# Patient Record
Sex: FEMALE | Race: WHITE | HISPANIC OR LATINO
[De-identification: names, ages, dates, MRNs, and addresses within clinical notes are randomized per-mention and may not be internally consistent; named-entity substitution may affect disease eponyms.]

---

## 2018-08-07 ENCOUNTER — TRANSCRIPTION ENCOUNTER (OUTPATIENT)
Age: 63
End: 2018-08-07

## 2018-08-07 NOTE — ASU PATIENT PROFILE, ADULT - PMH
DM (diabetes mellitus)    High cholesterol    HTN (hypertension) Autoimmune hepatitis    Chronic sinusitis    DM (diabetes mellitus)    High cholesterol    HTN (hypertension)    Hypothyroidism    Metabolic syndrome

## 2018-08-08 ENCOUNTER — OUTPATIENT (OUTPATIENT)
Dept: OUTPATIENT SERVICES | Facility: HOSPITAL | Age: 63
LOS: 1 days | End: 2018-08-08
Payer: MEDICAID

## 2018-08-08 VITALS
RESPIRATION RATE: 20 BRPM | OXYGEN SATURATION: 100 % | HEART RATE: 80 BPM | DIASTOLIC BLOOD PRESSURE: 77 MMHG | SYSTOLIC BLOOD PRESSURE: 156 MMHG

## 2018-08-08 VITALS
SYSTOLIC BLOOD PRESSURE: 137 MMHG | OXYGEN SATURATION: 98 % | RESPIRATION RATE: 19 BRPM | DIASTOLIC BLOOD PRESSURE: 71 MMHG | HEART RATE: 67 BPM | WEIGHT: 145.95 LBS | TEMPERATURE: 98 F | HEIGHT: 59 IN

## 2018-08-08 DIAGNOSIS — H25.10 AGE-RELATED NUCLEAR CATARACT, UNSPECIFIED EYE: ICD-10-CM

## 2018-08-08 DIAGNOSIS — Z98.890 OTHER SPECIFIED POSTPROCEDURAL STATES: Chronic | ICD-10-CM

## 2018-08-08 DIAGNOSIS — Z98.891 HISTORY OF UTERINE SCAR FROM PREVIOUS SURGERY: Chronic | ICD-10-CM

## 2018-08-08 LAB — GLUCOSE BLDC GLUCOMTR-MCNC: 94 MG/DL — SIGNIFICANT CHANGE UP (ref 70–99)

## 2018-08-08 PROCEDURE — 82962 GLUCOSE BLOOD TEST: CPT

## 2018-08-08 PROCEDURE — 66984 XCAPSL CTRC RMVL W/O ECP: CPT | Mod: LT

## 2018-08-08 PROCEDURE — V2632: CPT

## 2018-08-08 NOTE — ASU PREOP CHECKLIST - WAS PATIENT ON BETA BLOCKER?

## 2018-09-27 PROBLEM — E03.9 HYPOTHYROIDISM, UNSPECIFIED: Chronic | Status: ACTIVE | Noted: 2018-08-08

## 2018-09-27 PROBLEM — E88.81 METABOLIC SYNDROME AND OTHER INSULIN RESISTANCE: Chronic | Status: ACTIVE | Noted: 2018-08-08

## 2018-09-27 PROBLEM — K75.4 AUTOIMMUNE HEPATITIS: Chronic | Status: ACTIVE | Noted: 2018-08-08

## 2018-09-27 PROBLEM — I10 ESSENTIAL (PRIMARY) HYPERTENSION: Chronic | Status: ACTIVE | Noted: 2018-08-08

## 2018-09-27 PROBLEM — E11.9 TYPE 2 DIABETES MELLITUS WITHOUT COMPLICATIONS: Chronic | Status: ACTIVE | Noted: 2018-08-08

## 2018-09-27 PROBLEM — J32.9 CHRONIC SINUSITIS, UNSPECIFIED: Chronic | Status: ACTIVE | Noted: 2018-08-08

## 2018-09-27 PROBLEM — E78.00 PURE HYPERCHOLESTEROLEMIA, UNSPECIFIED: Chronic | Status: ACTIVE | Noted: 2018-08-08

## 2018-10-02 ENCOUNTER — TRANSCRIPTION ENCOUNTER (OUTPATIENT)
Age: 63
End: 2018-10-02

## 2018-10-02 NOTE — ASU PATIENT PROFILE, ADULT - PMH
Autoimmune hepatitis    Chronic sinusitis    DM (diabetes mellitus)    High cholesterol    HTN (hypertension)    Hypothyroidism    Metabolic syndrome

## 2018-10-03 ENCOUNTER — OUTPATIENT (OUTPATIENT)
Dept: OUTPATIENT SERVICES | Facility: HOSPITAL | Age: 63
LOS: 1 days | End: 2018-10-03
Payer: MEDICAID

## 2018-10-03 VITALS
DIASTOLIC BLOOD PRESSURE: 71 MMHG | SYSTOLIC BLOOD PRESSURE: 156 MMHG | HEART RATE: 59 BPM | OXYGEN SATURATION: 99 % | RESPIRATION RATE: 9 BRPM

## 2018-10-03 VITALS
HEIGHT: 59 IN | WEIGHT: 146.83 LBS | SYSTOLIC BLOOD PRESSURE: 144 MMHG | TEMPERATURE: 98 F | RESPIRATION RATE: 19 BRPM | OXYGEN SATURATION: 98 % | HEART RATE: 55 BPM | DIASTOLIC BLOOD PRESSURE: 70 MMHG

## 2018-10-03 DIAGNOSIS — H25.10 AGE-RELATED NUCLEAR CATARACT, UNSPECIFIED EYE: ICD-10-CM

## 2018-10-03 DIAGNOSIS — Z98.890 OTHER SPECIFIED POSTPROCEDURAL STATES: Chronic | ICD-10-CM

## 2018-10-03 DIAGNOSIS — Z98.891 HISTORY OF UTERINE SCAR FROM PREVIOUS SURGERY: Chronic | ICD-10-CM

## 2018-10-03 DIAGNOSIS — Z98.42 CATARACT EXTRACTION STATUS, LEFT EYE: Chronic | ICD-10-CM

## 2018-10-03 LAB — GLUCOSE BLDC GLUCOMTR-MCNC: 108 MG/DL — HIGH (ref 70–99)

## 2018-10-03 PROCEDURE — V2632: CPT

## 2018-10-03 PROCEDURE — 82962 GLUCOSE BLOOD TEST: CPT

## 2018-10-03 PROCEDURE — 66984 XCAPSL CTRC RMVL W/O ECP: CPT | Mod: RT

## 2021-07-02 ENCOUNTER — EMERGENCY (EMERGENCY)
Facility: HOSPITAL | Age: 66
LOS: 0 days | Discharge: HOME | End: 2021-07-02
Attending: EMERGENCY MEDICINE
Payer: MEDICAID

## 2021-07-02 VITALS
SYSTOLIC BLOOD PRESSURE: 156 MMHG | OXYGEN SATURATION: 100 % | HEART RATE: 73 BPM | TEMPERATURE: 98 F | DIASTOLIC BLOOD PRESSURE: 70 MMHG | RESPIRATION RATE: 18 BRPM

## 2021-07-02 VITALS
DIASTOLIC BLOOD PRESSURE: 93 MMHG | OXYGEN SATURATION: 97 % | SYSTOLIC BLOOD PRESSURE: 156 MMHG | RESPIRATION RATE: 16 BRPM | HEART RATE: 72 BPM | TEMPERATURE: 98 F

## 2021-07-02 DIAGNOSIS — E03.9 HYPOTHYROIDISM, UNSPECIFIED: ICD-10-CM

## 2021-07-02 DIAGNOSIS — E11.9 TYPE 2 DIABETES MELLITUS WITHOUT COMPLICATIONS: ICD-10-CM

## 2021-07-02 DIAGNOSIS — Z98.891 HISTORY OF UTERINE SCAR FROM PREVIOUS SURGERY: ICD-10-CM

## 2021-07-02 DIAGNOSIS — M54.5 LOW BACK PAIN: ICD-10-CM

## 2021-07-02 DIAGNOSIS — Z98.42 CATARACT EXTRACTION STATUS, LEFT EYE: Chronic | ICD-10-CM

## 2021-07-02 DIAGNOSIS — Z98.890 OTHER SPECIFIED POSTPROCEDURAL STATES: Chronic | ICD-10-CM

## 2021-07-02 DIAGNOSIS — Z98.42 CATARACT EXTRACTION STATUS, LEFT EYE: ICD-10-CM

## 2021-07-02 DIAGNOSIS — R10.9 UNSPECIFIED ABDOMINAL PAIN: ICD-10-CM

## 2021-07-02 DIAGNOSIS — E78.00 PURE HYPERCHOLESTEROLEMIA, UNSPECIFIED: ICD-10-CM

## 2021-07-02 DIAGNOSIS — Z87.19 PERSONAL HISTORY OF OTHER DISEASES OF THE DIGESTIVE SYSTEM: ICD-10-CM

## 2021-07-02 DIAGNOSIS — J32.9 CHRONIC SINUSITIS, UNSPECIFIED: ICD-10-CM

## 2021-07-02 DIAGNOSIS — R53.83 OTHER FATIGUE: ICD-10-CM

## 2021-07-02 DIAGNOSIS — R06.02 SHORTNESS OF BREATH: ICD-10-CM

## 2021-07-02 DIAGNOSIS — I10 ESSENTIAL (PRIMARY) HYPERTENSION: ICD-10-CM

## 2021-07-02 DIAGNOSIS — Z98.891 HISTORY OF UTERINE SCAR FROM PREVIOUS SURGERY: Chronic | ICD-10-CM

## 2021-07-02 LAB
ALBUMIN SERPL ELPH-MCNC: 3.8 G/DL — SIGNIFICANT CHANGE UP (ref 3.3–5)
ALP SERPL-CCNC: 64 U/L — SIGNIFICANT CHANGE UP (ref 40–120)
ALT FLD-CCNC: 24 U/L — SIGNIFICANT CHANGE UP (ref 12–78)
ANION GAP SERPL CALC-SCNC: 4 MMOL/L — LOW (ref 5–17)
APPEARANCE UR: CLEAR — SIGNIFICANT CHANGE UP
AST SERPL-CCNC: 20 U/L — SIGNIFICANT CHANGE UP (ref 15–37)
BASOPHILS # BLD AUTO: 0.02 K/UL — SIGNIFICANT CHANGE UP (ref 0–0.2)
BASOPHILS NFR BLD AUTO: 0.4 % — SIGNIFICANT CHANGE UP (ref 0–2)
BILIRUB SERPL-MCNC: 0.6 MG/DL — SIGNIFICANT CHANGE UP (ref 0.2–1.2)
BILIRUB UR-MCNC: NEGATIVE — SIGNIFICANT CHANGE UP
BUN SERPL-MCNC: 12 MG/DL — SIGNIFICANT CHANGE UP (ref 7–23)
CALCIUM SERPL-MCNC: 9 MG/DL — SIGNIFICANT CHANGE UP (ref 8.5–10.1)
CHLORIDE SERPL-SCNC: 104 MMOL/L — SIGNIFICANT CHANGE UP (ref 96–108)
CO2 SERPL-SCNC: 29 MMOL/L — SIGNIFICANT CHANGE UP (ref 22–31)
COLOR SPEC: YELLOW — SIGNIFICANT CHANGE UP
CREAT SERPL-MCNC: 0.6 MG/DL — SIGNIFICANT CHANGE UP (ref 0.5–1.3)
DIFF PNL FLD: NEGATIVE — SIGNIFICANT CHANGE UP
EOSINOPHIL # BLD AUTO: 0.19 K/UL — SIGNIFICANT CHANGE UP (ref 0–0.5)
EOSINOPHIL NFR BLD AUTO: 3.7 % — SIGNIFICANT CHANGE UP (ref 0–6)
GLUCOSE SERPL-MCNC: 85 MG/DL — SIGNIFICANT CHANGE UP (ref 70–99)
GLUCOSE UR QL: NEGATIVE MG/DL — SIGNIFICANT CHANGE UP
HCT VFR BLD CALC: 39.3 % — SIGNIFICANT CHANGE UP (ref 34.5–45)
HGB BLD-MCNC: 13.1 G/DL — SIGNIFICANT CHANGE UP (ref 11.5–15.5)
IMM GRANULOCYTES NFR BLD AUTO: 0.6 % — SIGNIFICANT CHANGE UP (ref 0–1.5)
KETONES UR-MCNC: NEGATIVE — SIGNIFICANT CHANGE UP
LEUKOCYTE ESTERASE UR-ACNC: NEGATIVE — SIGNIFICANT CHANGE UP
LIDOCAIN IGE QN: 148 U/L — SIGNIFICANT CHANGE UP (ref 73–393)
LYMPHOCYTES # BLD AUTO: 1.2 K/UL — SIGNIFICANT CHANGE UP (ref 1–3.3)
LYMPHOCYTES # BLD AUTO: 23.3 % — SIGNIFICANT CHANGE UP (ref 13–44)
MCHC RBC-ENTMCNC: 30.7 PG — SIGNIFICANT CHANGE UP (ref 27–34)
MCHC RBC-ENTMCNC: 33.3 GM/DL — SIGNIFICANT CHANGE UP (ref 32–36)
MCV RBC AUTO: 92 FL — SIGNIFICANT CHANGE UP (ref 80–100)
MONOCYTES # BLD AUTO: 0.49 K/UL — SIGNIFICANT CHANGE UP (ref 0–0.9)
MONOCYTES NFR BLD AUTO: 9.5 % — SIGNIFICANT CHANGE UP (ref 2–14)
NEUTROPHILS # BLD AUTO: 3.21 K/UL — SIGNIFICANT CHANGE UP (ref 1.8–7.4)
NEUTROPHILS NFR BLD AUTO: 62.5 % — SIGNIFICANT CHANGE UP (ref 43–77)
NITRITE UR-MCNC: NEGATIVE — SIGNIFICANT CHANGE UP
PH UR: 7 — SIGNIFICANT CHANGE UP (ref 5–8)
PLATELET # BLD AUTO: 269 K/UL — SIGNIFICANT CHANGE UP (ref 150–400)
POTASSIUM SERPL-MCNC: 3.6 MMOL/L — SIGNIFICANT CHANGE UP (ref 3.5–5.3)
POTASSIUM SERPL-SCNC: 3.6 MMOL/L — SIGNIFICANT CHANGE UP (ref 3.5–5.3)
PROT SERPL-MCNC: 7.5 GM/DL — SIGNIFICANT CHANGE UP (ref 6–8.3)
PROT UR-MCNC: NEGATIVE MG/DL — SIGNIFICANT CHANGE UP
RBC # BLD: 4.27 M/UL — SIGNIFICANT CHANGE UP (ref 3.8–5.2)
RBC # FLD: 13.7 % — SIGNIFICANT CHANGE UP (ref 10.3–14.5)
SODIUM SERPL-SCNC: 137 MMOL/L — SIGNIFICANT CHANGE UP (ref 135–145)
SP GR SPEC: 1.01 — SIGNIFICANT CHANGE UP (ref 1.01–1.02)
TROPONIN I SERPL-MCNC: <0.015 NG/ML — SIGNIFICANT CHANGE UP (ref 0.01–0.04)
UROBILINOGEN FLD QL: NEGATIVE MG/DL — SIGNIFICANT CHANGE UP
WBC # BLD: 5.14 K/UL — SIGNIFICANT CHANGE UP (ref 3.8–10.5)
WBC # FLD AUTO: 5.14 K/UL — SIGNIFICANT CHANGE UP (ref 3.8–10.5)

## 2021-07-02 PROCEDURE — 93010 ELECTROCARDIOGRAM REPORT: CPT

## 2021-07-02 PROCEDURE — 81003 URINALYSIS AUTO W/O SCOPE: CPT

## 2021-07-02 PROCEDURE — 74176 CT ABD & PELVIS W/O CONTRAST: CPT

## 2021-07-02 PROCEDURE — 93005 ELECTROCARDIOGRAM TRACING: CPT

## 2021-07-02 PROCEDURE — 96374 THER/PROPH/DIAG INJ IV PUSH: CPT

## 2021-07-02 PROCEDURE — 84484 ASSAY OF TROPONIN QUANT: CPT

## 2021-07-02 PROCEDURE — 80053 COMPREHEN METABOLIC PANEL: CPT

## 2021-07-02 PROCEDURE — 99284 EMERGENCY DEPT VISIT MOD MDM: CPT | Mod: 25

## 2021-07-02 PROCEDURE — 99285 EMERGENCY DEPT VISIT HI MDM: CPT

## 2021-07-02 PROCEDURE — 71046 X-RAY EXAM CHEST 2 VIEWS: CPT | Mod: 26

## 2021-07-02 PROCEDURE — 74176 CT ABD & PELVIS W/O CONTRAST: CPT | Mod: 26,MA

## 2021-07-02 PROCEDURE — 83690 ASSAY OF LIPASE: CPT

## 2021-07-02 PROCEDURE — 85025 COMPLETE CBC W/AUTO DIFF WBC: CPT

## 2021-07-02 PROCEDURE — 71046 X-RAY EXAM CHEST 2 VIEWS: CPT

## 2021-07-02 PROCEDURE — 87086 URINE CULTURE/COLONY COUNT: CPT

## 2021-07-02 PROCEDURE — 36415 COLL VENOUS BLD VENIPUNCTURE: CPT

## 2021-07-02 RX ORDER — SODIUM CHLORIDE 9 MG/ML
1000 INJECTION INTRAMUSCULAR; INTRAVENOUS; SUBCUTANEOUS ONCE
Refills: 0 | Status: COMPLETED | OUTPATIENT
Start: 2021-07-02 | End: 2021-07-02

## 2021-07-02 RX ORDER — CYCLOBENZAPRINE HYDROCHLORIDE 10 MG/1
1 TABLET, FILM COATED ORAL
Qty: 21 | Refills: 0
Start: 2021-07-02 | End: 2021-07-08

## 2021-07-02 RX ORDER — KETOROLAC TROMETHAMINE 30 MG/ML
15 SYRINGE (ML) INJECTION ONCE
Refills: 0 | Status: DISCONTINUED | OUTPATIENT
Start: 2021-07-02 | End: 2021-07-02

## 2021-07-02 RX ADMIN — SODIUM CHLORIDE 1000 MILLILITER(S): 9 INJECTION INTRAMUSCULAR; INTRAVENOUS; SUBCUTANEOUS at 11:27

## 2021-07-02 RX ADMIN — Medication 15 MILLIGRAM(S): at 11:27

## 2021-07-02 NOTE — ED STATDOCS - OBJECTIVE STATEMENT
67 y/o F PMHx DM, HLD, HTN, autoimmune hepatitis, and hypothyroidism, presents to the ED c/o back pain. Pt reports having back pain and tail bone radiating to her abd w/ associated fatigue starting 4 days ago. Pt endorses SOB 2/2 to the pain. Denies radiation to BLE. Pt has taken meds at home for the pain. Pt follows w/ a PCP, last seen 3 months ago.    ID: [id number]

## 2021-07-02 NOTE — ED STATDOCS - PROGRESS NOTE DETAILS
66 yr. old female presents to ED with back and tail bone pain associated with fatigue onset 4 days ago. No apparent injury. Reports SOB 2/2. No incontinence of urine or stool. Seen and examined by attending in Intake. Plan: IV, labs, CT abd./pelvis. Will F/U with results and re evaluate. Brandon CALDERON Chest X-ray TERESSA Taylor NP Reviewed results of Lab work and CT abd./pelvis with patient. Return instructions given for worsening symptoms. Agreed to F/U with PMD or Dr. Romeo as needed. Brandon NP

## 2021-07-02 NOTE — ED STATDOCS - PHYSICAL EXAMINATION
mild thoracic back pain low back pain rad to LLQ jno dys will check for UTI vs kidney, would consider lumbago. no red flags danger cuase of back pain sless dhiraj of acs dispoi  penidng labs, imaigng and reasses

## 2021-07-02 NOTE — ED STATDOCS - PATIENT PORTAL LINK FT
You can access the FollowMyHealth Patient Portal offered by [facility] by registering at the following website: [email or web address]. By joining [employer]’s FollowMyHealth portal, you will also be able to view your health information using other applications (apps) compatible with our system.

## 2021-07-02 NOTE — ED STATDOCS - CARE PROVIDER_API CALL
Farrah Mackay)  Family Medicine  [address] [place] [address]  Phone: [phone]  Fax: [phone]  Follow Up Time:     Ina Romeo)  Orthopaedic Surgery  [address] [facility], [address] [place] [address]  Phone: [phone]  Fax: [phone]  Follow Up Time:

## 2021-07-02 NOTE — ED STATDOCS - NSFOLLOWUPINSTRUCTIONS_ED_ALL_ED_FT
Rehabilitación de esguince o distensión de la parte inferior de la espalda    Low Back Sprain or Strain Rehab      Pregunte al médico qué ejercicios son seguros para usted. [place] los ejercicios exactamente dawna se lo haya indicado el médico y gradúelos dawna se lo hayan indicado. Es normal sentir un estiramiento leve, tironeo, opresión o malestar al hacer estos ejercicios. Deténgase de inmediato si siente un dolor repentino o el dolor empeora. No comience a hacer estos ejercicios hasta que se lo indique el médico.      Ejercicios de elongación y amplitud de movimiento    Estos ejercicios calientan los músculos y las articulaciones, y mejoran el movimiento y la flexibilidad de la espalda. Estos ejercicios también ayudan a aliviar el dolor, el adormecimiento y el hormigueo.      Rotación lumbar      1.Acuéstese boca arriba sobre un superficie firme y flexione las rodillas.      2.Coloque los brazos extendidos a los lados de modo que cada brazo forme un ángulo de 90 grados (ángulo recto) con respecto al cuerpo.      3.Mueva lentamente (rote) ambas rodillas hacia un lado del cuerpo hasta que sienta un estiramiento en la parte inferior de la espalda (wilner lumbar). Trate de no levantar los hombros del piso.      4.Mantenga esta posición becca __________ segundos.      5.Tensione los músculos abdominales y lentamente lleve las rodillas a la posición inicial.      6.Repita mckayla ejercicio del otro lado del cuerpo.      Repita __________ veces. Realice mckayla ejercicio __________ veces al día.      Rodilla al pecho      1.Acuéstese boca arriba en nakia superficie firme con las piernas extendidas.    2.Flexione nakia rodilla. [place] la rodilla con las swati y llévela hacia el pecho hasta que sienta un estiramiento suave en la parte inferior de la espalda y las nalgas.  •Mantenga la pierna en esta posición tomando la parte frontal de la rodilla.      •Mantenga la otra pierna lo más extendida posible.        3.Mantenga esta posición becca __________ segundos.      4.Vuelva lentamente a la posición inicial.      5.Repita el ejercicio con la otra pierna.      Repita __________ veces. Realice mckayla ejercicio __________ veces al día.      Extensión sobre los codos, en decúbito prono      1.Recuéstese boca abajo sobre nakia superficie firme (posición prona).      2.Apóyese sobre los codos.    3.Con los brazos, ayúdese a levantar el pecho hasta sentir un leve estiramiento en el abdomen y la parte inferior de la espalda.  •[place] colocará algo de peso corporal sobre los codos. Si no se siente cómodo, intente colocando almohadas debajo del pecho.      •Debe dejar la cadera inmóvil sobre la superficie en la que esté apoyado. Mantenga la cadera y los músculos de la espalda relajados.        4.Mantenga esta posición becca __________ segundos.      5.Afloje lentamente la parte superior del cuerpo y vuelva a la posición inicial.      Repita __________ veces. Realice mckayla ejercicio __________ veces al día.      Ejercicios de fortalecimiento    Estos ejercicios fortalecen la espalda y le otorgan resistencia. La resistencia es la capacidad de usar los músculos becca un tiempo prolongado, incluso después de que se cansen.    Inclinación de la pelvis   Mckayla ejercicio fortalece los músculos que se encuentran en la parte profunda del abdomen.  1.Acuéstese boca arriba sobre nakia superficie firme. Doble las rodillas y mantenga los pies planos sobre el piso.    2.Tensione los músculos abdominales. Eleve la pelvis hacia el techo y aplane la parte inferior de la espalda contra el suelo.  •Para realizar mckayla ejercicio, puede colocar nakia toalla pequeña debajo de la parte inferior de la espalda y presionar la espalda contra la toalla.        3.Mantenga esta posición becca __________ segundos.      4.Relaje totalmente los músculos antes de repetir el ejercicio.      Repita __________ veces. Realice mckayla ejercicio __________ veces al día.      Elevaciones alternadas de pierna y brazo    1.Apoye las joaquin de las swati y las rodillas sobre nakia superficie firme. Si está sobre un suelo alia, puede usar un elemento acolchado, dawna nakia alfombrilla para ejercicios, para apoyar las rodillas.      2.Alinee los brazos y las piernas. Las swati deben estar gabriel debajo de los hombros, y las rodillas debajo de la cadera.    3.Eleve la pierna izquierda hacia atrás. Al mismo tiempo, eleve el brazo derecho y estírelo frente a usted.  •No eleve la pierna por encima de la cadera.      •No eleve el brazo por encima del hombro.      •Mantenga los músculos del abdomen y de la espalda contraídos.      •Mantenga la cadera mirando hacia el suelo.      •No arquee la espalda.      •Mantenga el equilibrio con cuidado y no contenga la respiración.        4.Mantenga esta posición becca __________ segundos.      5.Vuelva lentamente a la posición inicial.      6.Repita con murry pierna derecha y murry brazo john.      Repita __________ veces. Realice mckayla ejercicio __________ veces al día.      Serie de abdominales con levantamiento de pierna extendida    1.Acuéstese boca arriba sobre nakia superficie firme.      2.Flexione nakia rodilla y mantenga la otra pierna extendida.      3.Tensione los músculos abdominales y levante la pierna extendida, a unas 4 o 6 pulgadas (10 o 15 cm) del suelo.    4.Mantenga apretados los músculos abdominales y sostenga esta posición becca __________ segundos.  •No contenga la respiración.      •No arquee la espalda. Manténgala plana contra el suelo.        5.Mantenga tensos los músculos abdominales mientras baja lentamente la pierna hasta la posición inicial.      6.Repita el ejercicio con la otra pierna.      Repita __________ veces. Realice mckayla ejercicio __________ veces al día.    Bajar nakia pierna con rodillas flexionadas    1.Acuéstese boca arriba sobre nakia superficie firme.    2.Apriete los músculos abdominales y despegue los pies del piso, laurel a la vez, de modo que las rodillas y la cadera estén flexionadas en ángulos de 90 grados (ángulos rectos).  •Las rodillas deben estar por encima de la cadera y las pantorrillas deben quedar paralelas al piso.        3.Con los músculos abdominales tensos y la rodilla flexionada, baje lentamente nakia pierna de modo que los dedos del pie toquen el suelo.    4.Levante la pierna para volver a la posición inicial.  •No contenga la respiración.      •No deje que la espalda se arquee. Mantenga la espalda plana contra el suelo.        5.Repita el ejercicio con la otra pierna.      Repita __________ veces. Realice mckayla ejercicio __________ veces al día.      Postura y mecánica corporal  La buena postura y la mecánica corporal saludable pueden ayudar a aliviar el estrés en las articulaciones y los tejidos del cuerpo. La mecánica corporal se refiere a los movimientos y a las posiciones del cuerpo mientras realiza las actividades diarias. La postura es nakia parte de la mecánica corporal. [place] buena postura significa que:  •La columna está en murry posición natural de curvatura en S (neutral).      •Los hombros están ligeramente hacia atrás.      •La cliff no está inclinada hacia adelante.      Siga esas pautas para mejorar la postura y la mecánica corporal en dhiraj actividades diarias.      De pie    •Al estar de pie, mantenga la columna en la posición neutral y los pies separados al ancho de caderas, aproximadamente. Mantenga las rodillas ligeramente flexionadas. Las orejas, los hombros y las caderas deben estar alineados.      •Cuando realice nakia tarea en la que deba estar de pie en el mismo sitio becca mucho tiempo, coloque un pie en un objeto estable de 2 a 4 pulgadas (5 a 10 cm) de alto, dawna un taburete. [place] ayuda a que la columna mantenga nakia posición neutral.        Sentado    •Cuando esté sentado, mantenga la columna en posición neutral y deje los pies apoyados en el suelo. Use un apoyapiés, si es necesario, y mantenga los muslos paralelos al suelo. Evite redondear los hombros e inclinar la cliff hacia adelante.      •Cuando trabaje en un escritorio o con nakia computadora, el escritorio debe estar a nakia altura en la que las swati estén un poco más abajo que los codos. Deslice la silla debajo del escritorio, de modo de estar lo suficientemente cerca dawna para mantener nakia buena postura.      •Cuando trabaje con nakia computadora, coloque el monitor a nakia altura que le permita mirar derecho hacia adelante, sin tener que inclinar la cliff hacia adelante o hacia atrás.      Reposo    •Al descansar o estar acostado, evite las posiciones que le causen más dolor.      •Si siente dolor al hacer actividades que exigen sentarse, inclinarse, agacharse o ponerse en cuclillas, acuéstese en nakia posición en la que el cuerpo no deba doblarse mucho. Por ejemplo, evite acurrucarse de costado con los brazos y las rodillas cerca del pecho (posición fetal).    •Si siente dolor con las actividades que exigen estar de pie becca mucho tiempo o estirar los brazos, acuéstese con la columna en nakia posición neutral y flexione ligeramente las rodillas. Pruebe con las siguientes posiciones:  •Acostarse de costado con nakia almohada entre las rodillas.      •Acostarse boca arriba con nakia almohada debajo de las rodillas.          Levantar objetos    •Cuando tenga que levantar un objeto, mantenga los pies separados el ancho de los hombros, dawna mínimo, y apriete los músculos abdominales.      •Flexione las rodillas y la cadera, y mantenga la columna en posición neutral. Es importante levantarse utilizando la fuerza de las piernas, no de la espalda. No trabe las rodillas hacia afuera.      •Siempre pida ayuda a otra persona para levantar objetos pesados o incómodos.      Esta información no tiene dawna fin reemplazar el consejo del médico. Asegúrese de hacerle al médico cualquier pregunta que tenga.      Rest. Drink plenty of fluids.  Ibuprofen 600 mg. PO every 6 hrs. for pain alternate with Tylenol 650 mg. PO every 4 hrs.  Flexeril as directed for muscle tightness.  Follow up with PMD and Spine specialist as needed.

## 2021-07-02 NOTE — ED STATDOCS - CLINICAL SUMMARY MEDICAL DECISION MAKING FREE TEXT BOX
mild thoracic back pain, low back pain radiating to LLQ. no dysuria, will check for UTI vs kidney, would consider lumbago. no red flags or dangerous causes of back pain. less suspicious of acs, dispo pending labs, imaging and reassessment. mild thoracic back pain, low back pain radiating to LLQ. no dysuria, will check for UTI vs kidney stone, would consider lumbago. no red flags or dangerous causes of back pain. less suspicious for acs, dispo pending labs, imaging and reassessment.

## 2021-07-02 NOTE — ED ADULT NURSE NOTE - OBJECTIVE STATEMENT
pt arrives to ED complaining of dizziness. pt states she woke up and felt the room spinning. pt also complains of lower back pain. denies trauma. Hx HLD, DM, HTN, hypothyroidism. alert and oriented x 4.

## 2021-07-03 LAB
CULTURE RESULTS: SIGNIFICANT CHANGE UP
SPECIMEN SOURCE: SIGNIFICANT CHANGE UP

## 2021-11-15 ENCOUNTER — EMERGENCY (EMERGENCY)
Facility: HOSPITAL | Age: 66
LOS: 1 days | Discharge: HOME | End: 2021-11-15
Attending: STUDENT IN AN ORGANIZED HEALTH CARE EDUCATION/TRAINING PROGRAM
Payer: MEDICAID

## 2021-11-15 VITALS — WEIGHT: 147.93 LBS | HEIGHT: 59 IN

## 2021-11-15 VITALS
DIASTOLIC BLOOD PRESSURE: 78 MMHG | TEMPERATURE: 98 F | OXYGEN SATURATION: 98 % | RESPIRATION RATE: 16 BRPM | SYSTOLIC BLOOD PRESSURE: 131 MMHG | HEART RATE: 64 BPM

## 2021-11-15 DIAGNOSIS — Z98.891 HISTORY OF UTERINE SCAR FROM PREVIOUS SURGERY: Chronic | ICD-10-CM
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
## 2021-11-15 DIAGNOSIS — S00.511A ABRASION OF LIP, INITIAL ENCOUNTER: ICD-10-CM

## 2021-11-15 DIAGNOSIS — S40.011A CONTUSION OF RIGHT SHOULDER, INITIAL ENCOUNTER: ICD-10-CM

## 2021-11-15 DIAGNOSIS — E78.00 PURE HYPERCHOLESTEROLEMIA, UNSPECIFIED: ICD-10-CM

## 2021-11-15 DIAGNOSIS — S09.90XA UNSPECIFIED INJURY OF HEAD, INITIAL ENCOUNTER: ICD-10-CM

## 2021-11-15 DIAGNOSIS — E03.9 HYPOTHYROIDISM, UNSPECIFIED: ICD-10-CM

## 2021-11-15 DIAGNOSIS — J32.9 CHRONIC SINUSITIS, UNSPECIFIED: ICD-10-CM

## 2021-11-15 DIAGNOSIS — I10 ESSENTIAL (PRIMARY) HYPERTENSION: ICD-10-CM

## 2021-11-15 DIAGNOSIS — Z98.890 OTHER SPECIFIED POSTPROCEDURAL STATES: Chronic | ICD-10-CM

## 2021-11-15 DIAGNOSIS — R51.9 HEADACHE, UNSPECIFIED: ICD-10-CM

## 2021-11-15 DIAGNOSIS — Z98.42 CATARACT EXTRACTION STATUS, LEFT EYE: Chronic | ICD-10-CM

## 2021-11-15 DIAGNOSIS — M47.812 SPONDYLOSIS WITHOUT MYELOPATHY OR RADICULOPATHY, CERVICAL REGION: ICD-10-CM
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
## 2021-11-15 DIAGNOSIS — M25.511 PAIN IN RIGHT SHOULDER: ICD-10-CM

## 2021-11-15 DIAGNOSIS — S02.5XXA FRACTURE OF TOOTH (TRAUMATIC), INITIAL ENCOUNTER FOR CLOSED FRACTURE: ICD-10-CM

## 2021-11-15 DIAGNOSIS — Z87.59 PERSONAL HISTORY OF OTHER COMPLICATIONS OF PREGNANCY, CHILDBIRTH AND THE PUERPERIUM: ICD-10-CM

## 2021-11-15 DIAGNOSIS — T31.0 BURNS INVOLVING LESS THAN 10% OF BODY SURFACE: ICD-10-CM

## 2021-11-15 DIAGNOSIS — T22.032A BURN OF UNSPECIFIED DEGREE OF LEFT UPPER ARM, INITIAL ENCOUNTER: ICD-10-CM

## 2021-11-15 DIAGNOSIS — K75.4 AUTOIMMUNE HEPATITIS: ICD-10-CM

## 2021-11-15 DIAGNOSIS — E88.81 METABOLIC SYNDROME AND OTHER INSULIN RESISTANCE: ICD-10-CM

## 2021-11-15 PROCEDURE — 73030 X-RAY EXAM OF SHOULDER: CPT | Mod: 26,RT

## 2021-11-15 PROCEDURE — 73090 X-RAY EXAM OF FOREARM: CPT | Mod: 26,RT

## 2021-11-15 PROCEDURE — 73130 X-RAY EXAM OF HAND: CPT | Mod: 26,RT

## 2021-11-15 PROCEDURE — 72125 CT NECK SPINE W/O DYE: CPT | Mod: 26,MA

## 2021-11-15 PROCEDURE — 76376 3D RENDER W/INTRP POSTPROCES: CPT

## 2021-11-15 PROCEDURE — 99284 EMERGENCY DEPT VISIT MOD MDM: CPT | Mod: 25

## 2021-11-15 PROCEDURE — 73130 X-RAY EXAM OF HAND: CPT | Mod: RT

## 2021-11-15 PROCEDURE — 73110 X-RAY EXAM OF WRIST: CPT | Mod: 50

## 2021-11-15 PROCEDURE — 72125 CT NECK SPINE W/O DYE: CPT | Mod: MA

## 2021-11-15 PROCEDURE — 70450 CT HEAD/BRAIN W/O DYE: CPT | Mod: 26,MA

## 2021-11-15 PROCEDURE — 93010 ELECTROCARDIOGRAM REPORT: CPT

## 2021-11-15 PROCEDURE — 70486 CT MAXILLOFACIAL W/O DYE: CPT | Mod: MA

## 2021-11-15 PROCEDURE — 99285 EMERGENCY DEPT VISIT HI MDM: CPT

## 2021-11-15 PROCEDURE — 76376 3D RENDER W/INTRP POSTPROCES: CPT | Mod: 26

## 2021-11-15 PROCEDURE — 93005 ELECTROCARDIOGRAM TRACING: CPT

## 2021-11-15 PROCEDURE — 73030 X-RAY EXAM OF SHOULDER: CPT | Mod: RT

## 2021-11-15 PROCEDURE — 70450 CT HEAD/BRAIN W/O DYE: CPT | Mod: MA

## 2021-11-15 PROCEDURE — 70486 CT MAXILLOFACIAL W/O DYE: CPT | Mod: 26,MA

## 2021-11-15 PROCEDURE — 73090 X-RAY EXAM OF FOREARM: CPT | Mod: RT

## 2021-11-15 PROCEDURE — 73110 X-RAY EXAM OF WRIST: CPT | Mod: 26,50

## 2021-11-15 RX ORDER — ACETAMINOPHEN 500 MG
975 TABLET ORAL ONCE
Refills: 0 | Status: COMPLETED | OUTPATIENT
Start: 2021-11-15 | End: 2021-11-15

## 2021-11-15 RX ORDER — IBUPROFEN 200 MG
600 TABLET ORAL ONCE
Refills: 0 | Status: COMPLETED | OUTPATIENT
Start: 2021-11-15 | End: 2021-11-15

## 2021-11-15 RX ADMIN — Medication 975 MILLIGRAM(S): at 17:35

## 2021-11-15 RX ADMIN — Medication 600 MILLIGRAM(S): at 17:35

## 2021-11-15 NOTE — ED STATDOCS - NSICDXPASTSURGICALHX_GEN_ALL_CORE_FT
PAST SURGICAL HISTORY:  Personal history of spine surgery     S/P  section     S/P left cataract extraction

## 2021-11-15 NOTE — ED STATDOCS - NSFOLLOWUPCLINICS_GEN_ALL_ED_FT
[facility]  Family Medicine  [address] [place] [address]  Phone: [phone]  Fax:

## 2021-11-15 NOTE — ED STATDOCS - CLINICAL SUMMARY MEDICAL DECISION MAKING FREE TEXT BOX
elderly fall mechanical, non syncopal with head  trauma. will get CT, X ray pain meds and reassess. Wendi RANDHAWA: 65 y/o F with a PMHx of metabolic syndrome, HTN, hypothyroidism, autoimmune hepatitis, chronic sinusitis, HLD presents to the ED s/p unwitnessed mechanical trip and fall yesterday at the bottom of the stairs approximately 530 PM. Pt here c/o R shoulder pain, HA, face pain, burning to the R hand. Pt fell forward, (+) head strike, falling onto her face. Denies SOB or CP preceding the fall. No LOC. Pt also broke her dentures  exam vss non toxic well appearing ddx c/f elderly fall mechanical, non syncopal with head  trauma. will get CT, X ray pain meds and reassess.

## 2021-11-15 NOTE — ED STATDOCS - NSICDXPASTMEDICALHX_GEN_ALL_CORE_FT
PAST MEDICAL HISTORY:  Autoimmune hepatitis     Chronic sinusitis     DM (diabetes mellitus)     High cholesterol     HTN (hypertension)     Hypothyroidism     Metabolic syndrome

## 2021-11-15 NOTE — ED ADULT TRIAGE NOTE - CHIEF COMPLAINT QUOTE
c/o trip and fall yesterday, denies LOC, unknown if she takes anticoagulants, c/o dizziness after episode, hit right side of face on concrete, c/o right hand burning pain, pain to right side of face HX: DM, HTN, and takes medication for liver, has been taking tylenol with no relief in pain

## 2021-11-15 NOTE — ED STATDOCS - NSFOLLOWUPINSTRUCTIONS_ED_ALL_ED_FT
Contusión    Contusion      Nakia contusión es un hematoma profundo. Las contusiones son el resultado de un traumatismo cerrado en los tejidos y las fibras musculares que están debajo de la piel. La lesión causa nakia hemorragia debajo de la piel. La piel sobre la contusión puede ponerse de color [place], aydee o amarillo. Las lesiones menores le causarán nakia contusión indolora; sin embargo, las lesiones más graves causan contusiones en las que el dolor y la hinchazón pueden prolongarse becca algunas semanas.      Siga estas indicaciones en murry casa:    Esté atento a cualquier cambio en los síntomas. Informe a murry médico acerca de glenda síntomas. [place] estas medidas para aliviar el dolor.      Control del dolor, el entumecimiento y la hinchazón    •Use reposo, aplicación de hielo y aplicación de presión (compresión), y poner en alto (elevar) la wilner lesionada. Generalmente, esto se conoce dawna la estrategia de RHCE.  •Mantenga la wilner de la lesión en reposo. [place] glenda actividades normales según lo indicado por el médico. Pregúntele al médico qué actividades son seguras para usted.    •Si se lo indican, aplique hielo sobre la wilner de la lesión:  •Ponga el hielo en nakia bolsa plástica.      •Coloque nakia toalla entre la piel y la bolsa.      •Coloque el hielo becca 20 minutos, 2 a 3 veces al día.        •Si se lo indican, ejerza nakia compresión suave en la wilner de la lesión con nakia venda elástica. Asegúrese de que la venda no esté muy ajustada. Quítese y vuelva a colocarse la venda dawna se lo haya indicado el médico.      •Si es posible, cuando esté sentado o acostado, levante (eleve) la wilner lesionada por encima del nivel del corazón.        Indicaciones generales     •[place] los medicamentos de venta josie y los recetados solamente dawna se lo haya indicado el médico.      •Concurra a todas las visitas de control dawna se lo haya indicado el médico. [place] es importante.        Comuníquese con un médico si:    •Los síntomas no mejoran después de varios días de tratamiento.      •Glenda síntomas empeoran.      •Tiene dificultad para  la wilner lesionada.        Solicite ayuda inmediatamente si:    •Siente dolor intenso.      •Siente adormecimiento en nakia mano o un pie.      •La mano o el pie están pálidos o fríos.        Resumen    •Nakia contusión es un hematoma profundo.      •Las contusiones son el resultado de un traumatismo cerrado en los tejidos y las fibras musculares que están debajo de la piel.      •El tratamiento es hacer reposo, aplicarse hielo, compresión y elevar la wilner afectada. Es posible que le den analgésicos de venta josie.      •Comuníquese con un médico si los síntomas no mejoran o si empeoran.       •Solicite ayuda de inmediato si tiene dolor intenso, entumecimiento o si la wilner se torna pálida o fría.      Esta información no tiene dawna fin reemplazar el consejo del médico. Asegúrese de hacerle al médico cualquier pregunta que tenga.      Document Revised: 09/17/2019 Document Reviewed: 09/17/2019    Elsevier Patient Education © 2021 Elsevier Inc.    SIGUE A TU MÉDICO EN 1-2 DÍAS. REGRESE A LA ER PARA CUALQUIER SÍNTOMA PENDIENTE O NUEVAS PREOCUPACIONES.

## 2021-11-15 NOTE — ED STATDOCS - PROGRESS NOTE DETAILS
signed Charlotte Ramsay PA-C Pt seen initially in intake by Dr. Adame. Pt declines  services, prefers to use adult family at bedside. signed Charlotte Ramsay PA-C Pt seen initially in intake by Dr. Adame. Pt declines  services, prefers to use adult family at bedside.  66F s/p trip and fall yesterday at home, fell forward and landed on hands and knees and struck face. No LOC, not on anticoagulation. pt c/o pain 'everywhere' today, especially in face, right shoulder, both wrists, and right thumb CMC region. Pt lives with family and normally ambulates on her own. pt able to ambulate in ED without assistance. No significant findings on xray or CT. No snuffbox tenderness. Pt says her right forearm has a burning pain near the wrist. Pt says her upper dentures broke when she fell. Pt has minor abrasion of upper lip, otherwise no intraoral injury. Recommend f/u PMD or ortho. return precautions given. Pt feeling well, pt and family agree with DC and plan of care.

## 2021-11-15 NOTE — ED STATDOCS - PATIENT PORTAL LINK FT
You can access the FollowMyHealth Patient Portal offered by [facility] by registering at the following website: [email or web address]. By joining [employer]’s FollowMyHealth portal, you will also be able to view your health information using other applications (apps) compatible with our system.

## 2021-11-15 NOTE — ED STATDOCS - ATTENDING CONTRIBUTION TO CARE
I have personally performed a face to face medical and diagnostic evaluation of the patient. I have discussed with and reviewed the ACP's note and agree with the History, ROS, Physical Exam and MDM unless otherwise indicated. A brief summary of my personal evaluation and impression can be found below.    Wendi RANDHAWA: I performed the initial face to face bedside interview with this patient regarding history of present illness, review of symptoms and relevant past medical, social and family history.  I completed an independent physical examination.  I was the initial provider who evaluated this patient. I have signed out the follow up of any pending tests (i.e. labs, radiological studies) to the ACP.  I have communicated the patient’s plan of care and disposition with the ACP.  The history, relevant review of systems, past medical and surgical history, medical decision making, and physical examination was documented by the scribe in my presence and I attest to the accuracy of the documentation.

## 2021-11-15 NOTE — ED ADULT NURSE NOTE - NSIMPLEMENTINTERV_GEN_ALL_ED
Implemented All Universal Safety Interventions:  [place] to call system. Call bell, personal items and telephone within reach. Instruct patient to call for assistance. Room bathroom lighting operational. Non-slip footwear when patient is off stretcher. Physically safe environment: no spills, clutter or unnecessary equipment. Stretcher in lowest position, wheels locked, appropriate side rails in place.

## 2021-11-15 NOTE — ED STATDOCS - CARE PLAN
1 Principal Discharge DX:	Head injury  Secondary Diagnosis:	Bilateral arm pain  Secondary Diagnosis:	Fall  Secondary Diagnosis:	Right shoulder pain

## 2021-11-15 NOTE — ED STATDOCS - PHYSICAL EXAMINATION
VITALS: Initial triage and subsequent vitals have been reviewed by me.  GEN APPEARANCE: WDWN, alert and cooperative, non-toxic appearing and in NAD  HEAD: Atraumatic, normocephalic   EYES: PERRLa, EOMI, vision grossly intact.   EARS: Gross hearing intact.   NOSE: No nasal discharge, no external evidence of epistaxis.   NECK: Supple  CV: RRR, S1S2, no c/r/m/g. No cyanosis or pallor. Extremities warm, well perfused. Cap refill <2 seconds. No bruits.   LUNGS: CTAB. No wheezing. No rales. No rhonchi. No diminished breath sounds.   ABDOMEN: Soft, NTND. No guarding or rebound. No masses.   MSK/EXT: Spine appears normal, no spine point tenderness. No CVA ttp. Normal muscular development. Pelvis stable. No obvious joint or bony deformity, no peripheral edema. (+) B/L wrist tenderness; R forearm tenderness ; hematoma to R shoulder   NEURO: Alert, follows commands. Weight bearing normal. Speech normal. Sensation and motor normal x4 extremities.   SKIN: Normal color for race, warm, dry and intact. No evidence of rash.  PSYCH: Normal mood and affect. VITALS: Initial triage and subsequent vitals have been reviewed by me.  GEN APPEARANCE: WDWN, alert and cooperative, non-toxic appearing and in NAD  HEAD: Atraumatic, normocephalic   EYES: PERRLa, EOMI, vision grossly intact.   EARS: Gross hearing intact.   NOSE: No nasal discharge, no external evidence of epistaxis.   NECK: Supple  CV: RRR, S1S2, no c/r/m/g. No cyanosis or pallor. Extremities warm, well perfused. Cap refill <2 seconds. No bruits.   LUNGS: CTAB. No wheezing. No rales. No rhonchi. No diminished breath sounds.   ABDOMEN: Soft, NTND. No guarding or rebound. No masses.   MSK/EXT: Spine appears normal, no spine point tenderness. No CVA ttp. Normal muscular development. Pelvis stable. No obvious joint or bony deformity, no peripheral edema. (+) B/L wrist tenderness; R forearm tenderness ; hematoma to R shoulder   NEURO: Alert, follows commands. Weight bearing normal. Speech normal. Sensation and motor normal x4 extremities. CN2-12 normal, coordination normal, ambulating normally.  SKIN: Normal color for race, warm, dry and intact. No evidence of rash.  PSYCH: Normal mood and affect.

## 2021-11-15 NOTE — ED STATDOCS - OBJECTIVE STATEMENT
67 y/o F with a PMHx of metabolic syndrome, HTN, hypothyroidism, autoimmune hepatitis, chronic sinusitis, HLD presents to the ED s/p unwitnessed mechanical trip and fall yesterday at the bottom of the stairs approximately 530 PM. Pt here c/o R shoulder pain, HA, face pain, burning to the R hand. Pt fell forward, (+) head strike, falling onto her face. Denies SOB or CP preceding the fall. No LOC. Pt also broke her dentures. Pt states that immediately after the fall, she felt very dizzy. Pt walks at baseline with no assistance.  No N/V. No abd pain. NKDA. 65 y/o F with a PMHx of metabolic syndrome, HTN, hypothyroidism, autoimmune hepatitis, chronic sinusitis, HLD presents to the ED s/p unwitnessed mechanical trip and fall yesterday at the bottom of the stairs approximately 530 PM. Pt here c/o R shoulder pain, HA, face pain, burning to the R hand. Pt fell forward, (+) head strike, falling onto her face. Denies SOB or CP preceding the fall. No LOC. Pt also broke her dentures. Pt states that immediately after the fall, she felt very dizzy. Pt walks at baseline with no assistance.  No N/V. No abd pain. NKDA.  [place] interp offered declines, prefers family at bedside.

## 2021-12-23 NOTE — ED STATDOCS - CARE PROVIDER_API CALL
Exposed to covid on Saturday. Since Tuesday, c/o cough, sinus congestion but no fever.
Margaret Vines)  [facility]  [address] [place] [address]  Phone: [phone]  Fax: [phone]  Follow Up Time:

## 2022-04-05 NOTE — ED ADULT NURSE NOTE - CAS DISCH CONDITION
Physical Therapy  Facility/Department: [facility] MED SURG  Daily Treatment Note  NAME: Kendra Sarmiento  : 1944  MRN: [id number]    Date of Service: 2022    Discharge Recommendations:  Continue to assess pending progress        Assessment   Assessment: Patient states she is tired as she just finished a bath with OT, but is agreeable to work with PT to improve mobility. Modified independence for bed mobility and SBA sit to stand. CGA for transfers and to ambulate 48' with RW. Patient to follow up with ortho tomorrow and is hopeful she will be able to increase WB on L LE. Cont PT per POC to outlined goals. Prognosis: Good  PT Education: Energy Conservation; Functional Mobility Training;General Safety; Adaptive Device Training;Gait Training  Activity Tolerance  Activity Tolerance: Patient limited by fatigue;Patient limited by endurance     Patient Diagnosis(es): There were no encounter diagnoses. has a past medical history of Colitis, GERD (gastroesophageal reflux disease), Glaucoma, and PAD (peripheral artery disease) ([facility] [address] has a past surgical history that includes Tubal ligation; eye surgery; cyst removal; skin biopsy; and Total hip arthroplasty (Left, 2019).     Restrictions  Restrictions/Precautions  Restrictions/Precautions: Weight Bearing,Fall Risk (NWB L LE)  Required Braces or Orthoses?: Yes  Lower Extremity Weight Bearing Restrictions  Left Lower Extremity Weight Bearing: Non Weight Bearing  Required Braces or Orthoses  Left Lower Extremity Brace:  (ELS brace locked in extension)  Subjective   General  Chart Reviewed: Yes  Family / Caregiver Present: No          Orientation     Cognition      Objective   Bed mobility  Supine to Sit: Modified independent  Sit to Supine: Modified independent  Scooting: Modified independent  Transfers  Sit to Stand: Stand by assistance  Stand to sit: Stand by assistance  Bed to Chair: Contact guard assistance  Ambulation  Ambulation?: Yes  WB Status: NWB L LE  Ambulation 1  Surface: level tile  Device: Rolling Walker  Assistance: Contact guard assistance  Distance: 50'x1     Balance  Sitting - Static: Good  Sitting - Dynamic: Good  Standing - Static: Good  Standing - Dynamic: Good;-     Goals  Long term goals  Time Frame for Long term goals : 10 days  Long term goal 1: Patient will perform all bed mobility with independence. Long term goal 2: Patient will perform sit to stand and transfers, NWB L LE, with RW and SBA. Long term goal 3: Patient will ambulate 50'x2, NWB L LE, with RW and SBA. Long term goal 4: Patient will demonstrate independence with HEP. Patient Goals   Patient goals : Return home.     Plan    Plan  Times per week: 4-5x/week  Times per day: Daily  Current Treatment Recommendations: Kae Hutchins Training,Patient/Caregiver Education & Training,ROM,Balance Training,Gait Training,Home Exercise Program,Functional Mobility Training,Safety Education & Training  Safety Devices  Type of devices: Call light within reach,Left in bed,Nurse notified     Therapy Time   Individual Concurrent Group Co-treatment   Time In 1326         Time Out 1350         Minutes [address] [place] [address] Stable

## 2022-12-28 ENCOUNTER — EMERGENCY (EMERGENCY)
Facility: HOSPITAL | Age: 67
LOS: 0 days | Discharge: HOME | End: 2022-12-28
Attending: STUDENT IN AN ORGANIZED HEALTH CARE EDUCATION/TRAINING PROGRAM
Payer: MEDICAID

## 2022-12-28 VITALS
RESPIRATION RATE: 18 BRPM | HEART RATE: 97 BPM | TEMPERATURE: 98 F | OXYGEN SATURATION: 98 % | SYSTOLIC BLOOD PRESSURE: 124 MMHG | DIASTOLIC BLOOD PRESSURE: 65 MMHG

## 2022-12-28 DIAGNOSIS — Z20.822 CONTACT WITH AND (SUSPECTED) EXPOSURE TO COVID-19: ICD-10-CM

## 2022-12-28 DIAGNOSIS — Z98.890 OTHER SPECIFIED POSTPROCEDURAL STATES: Chronic | ICD-10-CM

## 2022-12-28 DIAGNOSIS — Z98.42 CATARACT EXTRACTION STATUS, LEFT EYE: Chronic | ICD-10-CM

## 2022-12-28 DIAGNOSIS — E78.00 PURE HYPERCHOLESTEROLEMIA, UNSPECIFIED: ICD-10-CM

## 2022-12-28 DIAGNOSIS — R05.9 COUGH, UNSPECIFIED: ICD-10-CM

## 2022-12-28 DIAGNOSIS — E88.81 METABOLIC SYNDROME AND OTHER INSULIN RESISTANCE: ICD-10-CM

## 2022-12-28 DIAGNOSIS — B34.9 VIRAL INFECTION, UNSPECIFIED: ICD-10-CM

## 2022-12-28 DIAGNOSIS — E03.9 HYPOTHYROIDISM, UNSPECIFIED: ICD-10-CM

## 2022-12-28 DIAGNOSIS — I10 ESSENTIAL (PRIMARY) HYPERTENSION: ICD-10-CM

## 2022-12-28 DIAGNOSIS — R51.9 HEADACHE, UNSPECIFIED: ICD-10-CM

## 2022-12-28 DIAGNOSIS — R50.9 FEVER, UNSPECIFIED: ICD-10-CM

## 2022-12-28 DIAGNOSIS — K75.4 AUTOIMMUNE HEPATITIS: ICD-10-CM

## 2022-12-28 DIAGNOSIS — Z98.891 HISTORY OF UTERINE SCAR FROM PREVIOUS SURGERY: Chronic | ICD-10-CM

## 2022-12-28 DIAGNOSIS — E11.9 TYPE 2 DIABETES MELLITUS WITHOUT COMPLICATIONS: ICD-10-CM

## 2022-12-28 DIAGNOSIS — Z87.59 PERSONAL HISTORY OF OTHER COMPLICATIONS OF PREGNANCY, CHILDBIRTH AND THE PUERPERIUM: ICD-10-CM

## 2022-12-28 LAB
FLUAV AG NPH QL: DETECTED
FLUBV AG NPH QL: SIGNIFICANT CHANGE UP
RSV RNA NPH QL NAA+NON-PROBE: SIGNIFICANT CHANGE UP
SARS-COV-2 RNA SPEC QL NAA+PROBE: SIGNIFICANT CHANGE UP

## 2022-12-28 PROCEDURE — 71045 X-RAY EXAM CHEST 1 VIEW: CPT | Mod: 26

## 2022-12-28 PROCEDURE — 0241U: CPT

## 2022-12-28 PROCEDURE — 71045 X-RAY EXAM CHEST 1 VIEW: CPT

## 2022-12-28 PROCEDURE — 99284 EMERGENCY DEPT VISIT MOD MDM: CPT

## 2022-12-28 PROCEDURE — 99283 EMERGENCY DEPT VISIT LOW MDM: CPT | Mod: 25

## 2022-12-28 NOTE — ED ADULT TRIAGE NOTE - CHIEF COMPLAINT QUOTE
Patient comes to ED with c/o headache and cough that started 4 days ago. She states he has a fever but does not know how high. Denies sick contacts, chestpain, SOB.

## 2022-12-28 NOTE — ED STATDOCS - NSFOLLOWUPINSTRUCTIONS_ED_ALL_ED_FT
Infección de las vías respiratorias superiores en adultos    Upper Respiratory Infection, Adult      Nakia infección de las vías respiratorias superiores (IVRS) es nakia infección viral común de la nariz, garganta y de las vías respiratorias superiores que conducen el aire a los pulmones. El tipo más común de IVRS es el resfrío común. Las IVRS generalmente mejoran solas, sin tratamiento médico.      ¿Cuáles son las causas?    La causa es un virus. Se puede contagiar mckayla virus:  •Al aspirar las gotitas que nakia persona infectada elimina al toser o estornudar.      •Tocar algo que estuvo expuesto al virus (está contaminado) y luego tocarse la boca, la nariz o los ojos.        ¿Qué incrementa el riesgo?    Es más propenso a contraer nakia IVRS si:  •Es muy pequeño o de edad muy avanzada.      •Tiene contacto cercano con otros, dawna en el trabajo, la escuela o un centro de atención médica.      •Fuma.      •Tiene nakia enfermedad cardíaca o pulmonar a jaycee plazo (crónica).      •Tiene debilitado el sistema encargado de combatir las enfermedades (sistema inmunitario).      •Tiene asma o alergias nasales.      •Está sufriendo mucho estrés.      •Tiene un déficit nutricional.        ¿Cuáles son los signos o síntomas?    La IVRS suele presentar alguno de los siguientes síntomas:  •Secreción nasal o nariz tapada (congestión).      •Tos.      •Estornudos.      •Dolor de garganta.      •Dolor de cliff.      •Fatiga.      •Fiebre.      •Pérdida del apetito.      •Dolor en la frente, detrás de los ojos y por encima de los pómulos (dolor sinusal).      •Sue musculares.      •Enrojecimiento o irritación de los ojos.      •Presión en los oídos o la chinmay.        ¿Cómo se diagnostica?    Esta afección se puede diagnosticar en función de los antecedentes médicos, los síntomas y un examen físico. El médico puede usar un hisopo para tali nakia muestra de mucosidad de la nariz (hisopado nasal). Esta muestra puede analizarse para determinar qué virus está provocando la enfermedad.      ¿Cómo se trata?    Las IVRS generalmente mejoran por sí solas en un período de entre 7 y 10 días. Los medicamentos no curan las IVRS, sherwin el médico puede recomendarle ciertos medicamentos para ayudar a aliviar los síntomas, dawna por ejemplo:  •Medicamentos para la tos de venta josie.      •Antitusivos. Toser es un tipo de defensa contra nakia infección que ayuda a limpiar el sistema respiratorio, de modo que tome estos medicamentos solo según se lo recomiende el médico.      •Medicamentos para bajar la fiebre.        Siga estas instrucciones en murry casa:    Actividad     •Descanse todo lo que sea necesario.      •Si tiene fiebre, quédese en murry casa, es decir, no vaya al trabajo o la escuela, hasta que no tenga fiebre o hasta que el médico le diga que la IVRS ya no se puede diseminar a otras personas (ya no contagia). El médico puede pedirle que use nakia máscara facial para evitar que disemine la infección.      Para aliviar los síntomas     •Sarina gárgaras con nakia mezcla de agua y sal 3 o 4 veces al día, o según sea necesario. Para preparar agua con sal, disuelva totalmente de ½ a 1 cucharadita (de 3 a 6 g) de sal en 1 taza (237 ml) de agua tibia.      •Use un humidificador de aire frío para agregar humedad al aire. [place] puede ayudarlo a que respire mejor.        Comida y bebida   A comparison of three sample cups showing dark yellow, yellow, and pale yellow urine.   •Deedee suficiente líquido dawna para mantener la orina de color amarillo pálido.      •Weidman sopas y caldos transparentes.        Instrucciones generales   A sign showing that a person should not smoke.   •Use los medicamentos de venta josie y los recetados solamente dawna se lo haya indicado el médico. Estos incluyen medicamentos para el resfrío, para bajar la fiebre y antitusivos.      • No consuma ningún producto que contenga nicotina o tabaco. Estos productos incluyen cigarrillos, tabaco para mascar y aparatos de vapeo, dawna los cigarrillos electrónicos. Si necesita ayuda para dejar de fumar, consulte al médico.      •Aléjese del humo de otros fumadores.      •Manténgase al día con todas las vacunas, incluso la vacuna anual (nakia vez al año) contra la gripe.      •Concurra a todas las visitas de seguimiento. [place] es importante.        Cómo evitar contagiar la infección a otros   Washing hands with soap and water.   Las IVRS pueden ser contagiosas. Para evitar que la infección se propague, tome las siguientes medidas:  •Lávese las swati con agua y jabón becca al menos 20 segundos. Use desinfectante para swati si no dispone de agua y jabón.      •Evite tocarse la boca, la chinmay, los ojos o la nariz.      •Tosa o estornude en un pañuelo de papel o en murry manga o codo en lugar de hacerlo en la mano o en el aire.        Comuníquese con un médico si:    •Empeora en lugar de mejorar.      •Tiene fiebre o escalofríos.      •Tiene mucosidad marrón o blanquita.      •Tiene nakia secreción amarilla o marrón de la nariz.      •Le duele la chinmay, especialmente al inclinarse hacia adelante.      •Tiene los ganglios del feliz hinchados.      •Siente dolor al tragar.      •Tiene zonas anjali en la parte de atrás de la garganta.        Solicite ayuda de inmediato si:    •La falta de aire empeora.    •Tiene síntomas intensos o persistentes de:  •Dolor de cliff.      •Dolor de oído.      •Dolor sinusal.      •Dolor de pecho.      •Tiene enfermedad pulmonar crónica junto con cualquiera de estos síntomas:  •Emitir sonidos de silbidos agudos al respirar, más a menudo al exhalar (sibilancias).      •Tos prolongada (más de 14 días).      •Tos con umesh.      •Cambio en la mucosidad habitual.        •Tiene rigidez en el feliz.    •Tiene cambios en:  •La visión.      •La audición.      •El razonamiento.      •El estado de ánimo.        Estos síntomas pueden indicar nakia emergencia. Solicite ayuda de inmediato. Llame al 911.   • No espere a william si los síntomas desaparecen.       • No conduzca por dhiraj propios medios hasta el hospital.         Resumen    •Nakia infección de las vías respiratorias superiores (IVRS) es nakia infección común de la nariz, la garganta y las vías respiratorias superiores que conducen el aire a los pulmones.      •La causa es un virus.      •Las IVRS generalmente mejoran por sí solas en un período de entre 7 y 10 días.      •Los medicamentos no curan las IVRS, sherwin el médico puede recomendarle ciertos medicamentos para ayudar a aliviar los síntomas.      Esta información no tiene dawna fin reemplazar el consejo del médico. Asegúrese de hacerle al médico cualquier pregunta que tenga.

## 2022-12-28 NOTE — ED STATDOCS - OBJECTIVE STATEMENT
68 y/o female w/ a PMHx of DM, HLD, HTN, autoimmune hepatitis, metabolic syndrome, left cataract extraction, , and hypothyroidism presents to the ED c/o fever, cough, body aches, and HA x4 days. Denies CP, SOB, or sick contacts. Pt states she has difficulty sleeping secondary to the HA. Pt flu vaccinated. No other complaints at this time.    [id number] 68 y/o female w/ a PMHx of DM, HLD, HTN, autoimmune hepatitis, metabolic syndrome, left cataract extraction, , and hypothyroidism presents to the ED c/o fever, cough, body aches, and HA x4 days. Denies CP, SOB, or sick contacts. Pt flu vaccinated. No other complaints at this time.    [id number]

## 2022-12-28 NOTE — ED STATDOCS - PROGRESS NOTE DETAILS
Pt. is a 67 year old female Hx DM, HLD, HTN, hypothyroid presents with cough, body aches, fever, HA x 4 days.  Neg. SOB or CP.  Pt. having trouble sleeping due to the headacke.  Judith Sen PA-C Pt. with probable viral syndrome.  CXR ? LLL infiltrate will treat with Zithromax.  DC with PMD follow up and return precautions.  Judith Sen PA-C Pt. is a 67 year old female Hx DM, HLD, HTN, hypothyroid presents with cough, body aches, fever, HA x 4 days.  Neg. SOB or CP.  Pt. having trouble sleeping due to the headache.  Judith Sen PA-C CXR negative for acute findings.  Will DC with supportive car at home.  Tylenol for pain and HA.  Return for worsening symptoms.  Judith Sen PA-C

## 2022-12-28 NOTE — ED STATDOCS - ATTENDING APP SHARED VISIT CONTRIBUTION OF CARE
I, Rocío Stanley DO,  performed the initial face to face bedside interview with this patient regarding history of present illness, review of symptoms and relevant past medical, social and family history.  I completed an independent physical examination.  I was the initial provider who evaluated this patient.   I personally saw the patient and performed a substantive portion of the visit including all aspects of the medical decision making.  I have signed out the follow up of any pending tests (i.e. labs, radiological studies) to the ACP.  I have communicated the patient’s plan of care and disposition with the ACP.  The history, relevant review of systems, past medical and surgical history, medical decision making, and physical examination was documented by the scribe in my presence and I attest to the accuracy of the documentation.

## 2022-12-28 NOTE — ED STATDOCS - NSFOLLOWUPCLINICS_GEN_ALL_ED_FT
[facility]  Family Medicine  [address] [place] [address]  Phone: [phone]  Fax:

## 2022-12-28 NOTE — ED STATDOCS - PATIENT PORTAL LINK FT
You can access the FollowMyHealth Patient Portal offered by [facility] by registering at the following website: [email or web address]. By joining [facility]’s FollowMyHealth portal, you will also be able to view your health information using other applications (apps) compatible with our system.

## 2023-08-24 ENCOUNTER — EMERGENCY (EMERGENCY)
Facility: HOSPITAL | Age: 68
LOS: 0 days | Discharge: HOME | End: 2023-08-25
Attending: EMERGENCY MEDICINE
Payer: MEDICAID

## 2023-08-24 VITALS
HEART RATE: 84 BPM | OXYGEN SATURATION: 97 % | TEMPERATURE: 99 F | DIASTOLIC BLOOD PRESSURE: 110 MMHG | RESPIRATION RATE: 17 BRPM | HEIGHT: 64 IN | SYSTOLIC BLOOD PRESSURE: 190 MMHG | WEIGHT: 145.06 LBS

## 2023-08-24 DIAGNOSIS — E88.81 METABOLIC SYNDROME AND OTHER INSULIN RESISTANCE: ICD-10-CM

## 2023-08-24 DIAGNOSIS — Z79.84 LONG TERM (CURRENT) USE OF ORAL HYPOGLYCEMIC DRUGS: ICD-10-CM

## 2023-08-24 DIAGNOSIS — R51.9 HEADACHE, UNSPECIFIED: ICD-10-CM

## 2023-08-24 DIAGNOSIS — E78.00 PURE HYPERCHOLESTEROLEMIA, UNSPECIFIED: ICD-10-CM

## 2023-08-24 DIAGNOSIS — Z98.42 CATARACT EXTRACTION STATUS, LEFT EYE: Chronic | ICD-10-CM
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
## 2023-08-24 DIAGNOSIS — Z87.59 PERSONAL HISTORY OF OTHER COMPLICATIONS OF PREGNANCY, CHILDBIRTH AND THE PUERPERIUM: ICD-10-CM

## 2023-08-24 DIAGNOSIS — M54.31 SCIATICA, RIGHT SIDE: ICD-10-CM

## 2023-08-24 DIAGNOSIS — R30.0 DYSURIA: ICD-10-CM

## 2023-08-24 DIAGNOSIS — S32.010A WEDGE COMPRESSION FRACTURE OF FIRST LUMBAR VERTEBRA, INITIAL ENCOUNTER FOR CLOSED FRACTURE: ICD-10-CM
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
## 2023-08-24 DIAGNOSIS — I10 ESSENTIAL (PRIMARY) HYPERTENSION: ICD-10-CM

## 2023-08-24 DIAGNOSIS — N39.0 URINARY TRACT INFECTION, SITE NOT SPECIFIED: ICD-10-CM

## 2023-08-24 DIAGNOSIS — E11.9 TYPE 2 DIABETES MELLITUS WITHOUT COMPLICATIONS: ICD-10-CM

## 2023-08-24 DIAGNOSIS — R20.2 PARESTHESIA OF SKIN: ICD-10-CM

## 2023-08-24 DIAGNOSIS — E03.9 HYPOTHYROIDISM, UNSPECIFIED: ICD-10-CM

## 2023-08-24 DIAGNOSIS — Z98.891 HISTORY OF UTERINE SCAR FROM PREVIOUS SURGERY: Chronic | ICD-10-CM

## 2023-08-24 DIAGNOSIS — Z98.890 OTHER SPECIFIED POSTPROCEDURAL STATES: Chronic | ICD-10-CM

## 2023-08-24 DIAGNOSIS — R50.9 FEVER, UNSPECIFIED: ICD-10-CM

## 2023-08-24 LAB
ALBUMIN SERPL ELPH-MCNC: 4.4 G/DL — SIGNIFICANT CHANGE UP (ref 3.3–5)
ALP SERPL-CCNC: 67 U/L — SIGNIFICANT CHANGE UP (ref 40–120)
ALT FLD-CCNC: 26 U/L — SIGNIFICANT CHANGE UP (ref 12–78)
ANION GAP SERPL CALC-SCNC: 4 MMOL/L — LOW (ref 5–17)
APPEARANCE UR: CLEAR — SIGNIFICANT CHANGE UP
APTT BLD: 27.7 SEC — SIGNIFICANT CHANGE UP (ref 24.5–35.6)
AST SERPL-CCNC: 20 U/L — SIGNIFICANT CHANGE UP (ref 15–37)
BACTERIA # UR AUTO: ABNORMAL
BASOPHILS # BLD AUTO: 0.04 K/UL — SIGNIFICANT CHANGE UP (ref 0–0.2)
BASOPHILS NFR BLD AUTO: 0.6 % — SIGNIFICANT CHANGE UP (ref 0–2)
BILIRUB SERPL-MCNC: 0.8 MG/DL — SIGNIFICANT CHANGE UP (ref 0.2–1.2)
BILIRUB UR-MCNC: NEGATIVE — SIGNIFICANT CHANGE UP
BUN SERPL-MCNC: 9 MG/DL — SIGNIFICANT CHANGE UP (ref 7–23)
CALCIUM SERPL-MCNC: 9.8 MG/DL — SIGNIFICANT CHANGE UP (ref 8.5–10.1)
CHLORIDE SERPL-SCNC: 103 MMOL/L — SIGNIFICANT CHANGE UP (ref 96–108)
CO2 SERPL-SCNC: 30 MMOL/L — SIGNIFICANT CHANGE UP (ref 22–31)
COLOR SPEC: YELLOW — SIGNIFICANT CHANGE UP
CREAT SERPL-MCNC: 0.68 MG/DL — SIGNIFICANT CHANGE UP (ref 0.5–1.3)
DIFF PNL FLD: NEGATIVE — SIGNIFICANT CHANGE UP
EGFR: 94 ML/MIN/1.73M2 — SIGNIFICANT CHANGE UP
EOSINOPHIL # BLD AUTO: 0.15 K/UL — SIGNIFICANT CHANGE UP (ref 0–0.5)
EOSINOPHIL NFR BLD AUTO: 2.3 % — SIGNIFICANT CHANGE UP (ref 0–6)
EPI CELLS # UR: SIGNIFICANT CHANGE UP
GLUCOSE SERPL-MCNC: 121 MG/DL — HIGH (ref 70–99)
GLUCOSE UR QL: NEGATIVE — SIGNIFICANT CHANGE UP
HCT VFR BLD CALC: 40.8 % — SIGNIFICANT CHANGE UP (ref 34.5–45)
HGB BLD-MCNC: 14 G/DL — SIGNIFICANT CHANGE UP (ref 11.5–15.5)
IMM GRANULOCYTES NFR BLD AUTO: 0.3 % — SIGNIFICANT CHANGE UP (ref 0–0.9)
INR BLD: 0.95 RATIO — SIGNIFICANT CHANGE UP (ref 0.85–1.18)
KETONES UR-MCNC: NEGATIVE — SIGNIFICANT CHANGE UP
LEUKOCYTE ESTERASE UR-ACNC: ABNORMAL
LYMPHOCYTES # BLD AUTO: 1.46 K/UL — SIGNIFICANT CHANGE UP (ref 1–3.3)
LYMPHOCYTES # BLD AUTO: 22.3 % — SIGNIFICANT CHANGE UP (ref 13–44)
MCHC RBC-ENTMCNC: 31 PG — SIGNIFICANT CHANGE UP (ref 27–34)
MCHC RBC-ENTMCNC: 34.3 GM/DL — SIGNIFICANT CHANGE UP (ref 32–36)
MCV RBC AUTO: 90.5 FL — SIGNIFICANT CHANGE UP (ref 80–100)
MONOCYTES # BLD AUTO: 0.54 K/UL — SIGNIFICANT CHANGE UP (ref 0–0.9)
MONOCYTES NFR BLD AUTO: 8.3 % — SIGNIFICANT CHANGE UP (ref 2–14)
NEUTROPHILS # BLD AUTO: 4.33 K/UL — SIGNIFICANT CHANGE UP (ref 1.8–7.4)
NEUTROPHILS NFR BLD AUTO: 66.2 % — SIGNIFICANT CHANGE UP (ref 43–77)
NITRITE UR-MCNC: NEGATIVE — SIGNIFICANT CHANGE UP
PH UR: 7 — SIGNIFICANT CHANGE UP (ref 5–8)
PLATELET # BLD AUTO: 315 K/UL — SIGNIFICANT CHANGE UP (ref 150–400)
POTASSIUM SERPL-MCNC: 4.1 MMOL/L — SIGNIFICANT CHANGE UP (ref 3.5–5.3)
POTASSIUM SERPL-SCNC: 4.1 MMOL/L — SIGNIFICANT CHANGE UP (ref 3.5–5.3)
PROT SERPL-MCNC: 8.6 GM/DL — HIGH (ref 6–8.3)
PROT UR-MCNC: NEGATIVE — SIGNIFICANT CHANGE UP
PROTHROM AB SERPL-ACNC: 10.7 SEC — SIGNIFICANT CHANGE UP (ref 9.5–13)
RBC # BLD: 4.51 M/UL — SIGNIFICANT CHANGE UP (ref 3.8–5.2)
RBC # FLD: 12.9 % — SIGNIFICANT CHANGE UP (ref 10.3–14.5)
RBC CASTS # UR COMP ASSIST: ABNORMAL /HPF (ref 0–4)
SODIUM SERPL-SCNC: 137 MMOL/L — SIGNIFICANT CHANGE UP (ref 135–145)
SP GR SPEC: 1 — LOW (ref 1.01–1.02)
UROBILINOGEN FLD QL: NEGATIVE — SIGNIFICANT CHANGE UP
WBC # BLD: 6.54 K/UL — SIGNIFICANT CHANGE UP (ref 3.8–10.5)
WBC # FLD AUTO: 6.54 K/UL — SIGNIFICANT CHANGE UP (ref 3.8–10.5)
WBC UR QL: ABNORMAL /HPF (ref 0–5)

## 2023-08-24 PROCEDURE — 96374 THER/PROPH/DIAG INJ IV PUSH: CPT

## 2023-08-24 PROCEDURE — 99285 EMERGENCY DEPT VISIT HI MDM: CPT

## 2023-08-24 PROCEDURE — 80053 COMPREHEN METABOLIC PANEL: CPT

## 2023-08-24 PROCEDURE — 85610 PROTHROMBIN TIME: CPT

## 2023-08-24 PROCEDURE — 36415 COLL VENOUS BLD VENIPUNCTURE: CPT

## 2023-08-24 PROCEDURE — 96375 TX/PRO/DX INJ NEW DRUG ADDON: CPT

## 2023-08-24 PROCEDURE — 87040 BLOOD CULTURE FOR BACTERIA: CPT

## 2023-08-24 PROCEDURE — 87086 URINE CULTURE/COLONY COUNT: CPT

## 2023-08-24 PROCEDURE — 85025 COMPLETE CBC W/AUTO DIFF WBC: CPT

## 2023-08-24 PROCEDURE — 99284 EMERGENCY DEPT VISIT MOD MDM: CPT | Mod: 25

## 2023-08-24 PROCEDURE — 85730 THROMBOPLASTIN TIME PARTIAL: CPT

## 2023-08-24 PROCEDURE — 81001 URINALYSIS AUTO W/SCOPE: CPT

## 2023-08-24 RX ORDER — MORPHINE SULFATE 50 MG/1
2 CAPSULE, EXTENDED RELEASE ORAL ONCE
Refills: 0 | Status: DISCONTINUED | OUTPATIENT
Start: 2023-08-24 | End: 2023-08-24

## 2023-08-24 RX ORDER — ONDANSETRON 8 MG/1
4 TABLET, FILM COATED ORAL ONCE
Refills: 0 | Status: COMPLETED | OUTPATIENT
Start: 2023-08-24 | End: 2023-08-24

## 2023-08-24 RX ORDER — CEFTRIAXONE 500 MG/1
1000 INJECTION, POWDER, FOR SOLUTION INTRAMUSCULAR; INTRAVENOUS ONCE
Refills: 0 | Status: COMPLETED | OUTPATIENT
Start: 2023-08-24 | End: 2023-08-24

## 2023-08-24 RX ORDER — DEXAMETHASONE 0.5 MG/5ML
6 ELIXIR ORAL ONCE
Refills: 0 | Status: COMPLETED | OUTPATIENT
Start: 2023-08-24 | End: 2023-08-24

## 2023-08-24 RX ORDER — SODIUM CHLORIDE 9 MG/ML
3 INJECTION INTRAMUSCULAR; INTRAVENOUS; SUBCUTANEOUS ONCE
Refills: 0 | Status: COMPLETED | OUTPATIENT
Start: 2023-08-24 | End: 2023-08-24

## 2023-08-24 RX ADMIN — ONDANSETRON 4 MILLIGRAM(S): 8 TABLET, FILM COATED ORAL at 22:55

## 2023-08-24 RX ADMIN — MORPHINE SULFATE 2 MILLIGRAM(S): 50 CAPSULE, EXTENDED RELEASE ORAL at 22:55

## 2023-08-24 RX ADMIN — SODIUM CHLORIDE 3 MILLILITER(S): 9 INJECTION INTRAMUSCULAR; INTRAVENOUS; SUBCUTANEOUS at 22:56

## 2023-08-24 RX ADMIN — Medication 6 MILLIGRAM(S): at 22:54

## 2023-08-24 NOTE — ED STATDOCS - CARE PROVIDER_API CALL
Shane Johnson [facility]  Spine Surgery  [address]  [place] [address]  Phone: [phone]  Fax: [phone]  Follow Up Time:

## 2023-08-24 NOTE — ED ADULT NURSE NOTE - NSFALLRISKINTERV_ED_ALL_ED
Assistance OOB with selected safe patient handling equipment if applicable/Assistance with ambulation/Communicate fall risk and risk factors to all staff, patient, and family/Monitor gait and stability/Provide visual cue: yellow wristband, yellow gown, etc/Reinforce activity limits and safety measures with patient and family/Call bell, personal items and telephone in reach/Instruct patient to call for assistance before getting out of bed/chair/stretcher/Non-slip footwear applied when patient is off stretcher/[place] to call system/Physically safe environment - no spills, clutter or unnecessary equipment/Purposeful Proactive Rounding/Room/bathroom lighting operational, light cord in reach

## 2023-08-24 NOTE — ED STATDOCS - CARE PLAN
1 Principal Discharge DX:	Acute UTI  Secondary Diagnosis:	Back pain with sciatica  Secondary Diagnosis:	Lumbar compression fracture

## 2023-08-24 NOTE — ED STATDOCS - NSFOLLOWUPINSTRUCTIONS_ED_ALL_ED_FT
Take medications as prescribed.  Avoid sudden movements, heavy lifting.  Follow up with your own doctor & Spine doctor as listed below.      Urinary Tract Infection in Older Adults    WHAT YOU NEED TO KNOW:    What is a urinary tract infection (UTI)? A UTI is caused by bacteria that get inside your urinary tract. Your urinary tract includes your kidneys, ureters, bladder, and urethra. A UTI is more common in your lower urinary tract, which includes your bladder and urethra.  Kidney, Ureters, Bladder    What increases my risk for a UTI?    A UTI within the last 3 months    Menopause in women    Enlarged prostate in men    Medicines that affect urination    Urinary incontinence (you cannot control your bladder)    Sexual intercourse    Medical conditions, such as diabetes or obesity    Urinary tract problems, such as a narrowing, kidney stones, or inability to empty your bladder completely  What are the signs and symptoms of a UTI?    Fever and chills    Pain or burning when you urinate    Urine that smells bad or looks cloudy, or blood in your urine    Urinating more often or waking from sleep to urinate    Sudden, strong need to urinate    Pain or pressure in your lower abdomen    Leaking urine    Confusion or agitation    Fatigue, shakiness, and weakness  How is a UTI diagnosed? Your healthcare provider will ask about your symptoms. He or she may also examine you. You may need any of the following:    A urinalysis will give information about your urinary tract and overall health.    A urine culture may show the type of germ causing the infection. You may need this test again if you continue to have signs and symptoms after a UTI is treated.  How is a UTI treated? Medicines treat the bacterial infection or decrease pain and burning when you urinate. You may also need medicines to decrease the urge to urinate often. If you have UTIs often (called recurrent UTIs), you may be given antibiotics to take regularly. You will be given directions for when and how to use antibiotics. The goal is to prevent UTIs but not cause antibiotic resistance by using antibiotics too often.    How can I manage my symptoms?    Drink liquids as directed. Liquids can help flush bacteria from your urinary tract. Ask how much liquid to drink each day and which liquids are best for you. You may need to drink more liquids than usual to help flush out the bacteria. Do not drink alcohol, caffeine, and citrus juices. These can irritate your bladder and increase your symptoms.    Apply heat on your abdomen for 20 to 30 minutes every 2 hours for as many days as directed. Heat helps decrease discomfort and pressure in your bladder.  How can I help prevent a UTI?    Urinate when you feel the urge. Do not hold your urine. Bacteria can grow if urine stays in the bladder too long. It may be helpful to urinate at least every 3 to 4 hours.    Urinate after you have sex. This will help flush away bacteria that can enter your urinary tract during sex.    Wear cotton underwear and clothes that are loose. Tight pants and nylon underwear can trap moisture and cause bacteria to grow.    Drink cranberry juice or take cranberry supplements. These may help prevent UTIs. Your healthcare provider can recommend the right juice or supplement for you.    Women should wipe front to back after urinating or having a bowel movement. This may prevent germs from getting into the urinary tract. Do not douche or use feminine deodorants. These can change the chemical balance in your vagina. You may also be given vaginal estrogen medicine. This medicine helps prevent recurrent UTIs in women who have gone through menopause or are in more-menopause.  When should I seek immediate care?    You become confused or agitated.    You fall down.    You are urinating very little or not at all.    You are vomiting.    You have a high fever with shaking chills.    You have side or back pain that gets worse.  When should I call my doctor?    You have a fever.    You are a woman and you have increased white or yellow discharge from your vagina.    You do not feel better after 2 days of taking antibiotics.    You have questions or concerns about your condition or care.  CARE AGREEMENT:    You have the right to help plan your care. Learn about your health condition and how it may be treated. Discuss treatment options with your healthcare providers to decide what care you want to receive. You always have the right to refuse treatment.        Fractura por aplastamiento vertebral  Spinal Compression Fracture    La fractura por aplastamiento vertebral es la quebradura de los huesos que ryan la columna (vértebras). En mckayla tipo de fractura, las vértebras son aplastadas (se comprimen) y quedan en forma de cuña. En murry mayoría, las fracturas por aplastamiento se producen en la sección central o inferior de la columna vertebral.    ¿Cuáles son las causas?  Esta afección puede ser causada por lo siguiente:  El debilitamiento y la pérdida de la densidad ósea (osteoporosis). Esta es la causa más frecuente.  Nakia caída.  Un accidente de automóvil o motocicleta.  Cáncer.  Un traumatismo, dawna un golpe fifi y directo en la cliff o la espalda.  ¿Qué incrementa el riesgo?  Es más probable que sufra esta afección si:  Es mayor de 60 años.  Tiene osteoporosis.  Tiene ciertos tipos de cáncer, dawna:  Mieloma múltiple.  Linfoma.  Cáncer de próstata.  Cáncer de pulmón.  Cáncer de mama.  ¿Cuáles son los signos o síntomas?  Los síntomas de esta afección incluyen:  Dolor intenso al hacer movimientos simples dawna toser o estornudar.  Dolor que empeora con el tiempo.  Dolor que empeora al ponerse de pie, caminar, sentarse o inclinarse.  Dolor repentino que es tan intenso que dificulta el movimiento.  Columna vertebral curvada o jorobada.  Disminución gradual de la estatura.  Entumecimiento, hormigueo o debilidad en la espalda y las piernas.  Dificultad para caminar.  Los síntomas dependerán de la causa de la fractura y de la rapidez de murry evolución.    ¿Cómo se diagnostica?  Esta afección se puede diagnosticar en función de los síntomas, la historia clínica y un examen físico. Maxwell el examen físico, el médico puede darle golpes suaves a lo jaycee de la columna vertebral para determinar si hay dolor a la palpación. Pueden hacerse estudios para confirmar el diagnóstico. Estos pueden incluir los siguientes:  Nakia prueba de densidad mineral ósea para determinar la presencia de osteoporosis.  Estudios de diagnóstico por imágenes, dawna nakia radiografía, nakia exploración por tomografía computarizada (TC) o nakia resonancia magnética (RM) de la columna vertebral.  ¿Cómo se trata?  El tratamiento depende de la causa y la gravedad de la afección. Algunas fracturas pueden consolidarse por sí solas con tratamiento de apoyo. El tratamiento puede incluir lo siguiente:  Analgésicos.  Reposo.  Un dispositivo ortopédico para la espalda.  Ejercicios de fisioterapia.  Medicamentos para fortalecer los huesos.  Suplementos de calcio y vitamina D.  Las fracturas que causan deformidad de la espalda, neuralgia o debilidad, o que no responden a otros tratamientos se pueden tratar con cirugía. Puede incluir:  Vertebroplastia. Se inyecta cemento óseo en las vértebras aplastadas para estabilizarlas.  Cifoplastia con balón. Las vértebras aplastadas se expanden con un balón y luego se les inyecta el cemento óseo.  Fusión vertebral. Las vértebras aplastadas se fusionan con las vértebras normales.  Siga estas instrucciones en murry casa:  Medicamentos    Use los medicamentos de venta josie y los recetados solamente dawna se lo haya indicado el médico.  Pregúntele al médico si el medicamento recetado:  Hace necesario que evite conducir o usar maquinaria.  Puede causarle estreñimiento. Es posible que tenga que tali estas medidas para prevenir o tratar el estreñimiento:  Beber suficiente líquido dawna para mantener la orina de color amarillo pálido.  Usar medicamentos recetados o de venta josie.  Consumir alimentos ricos en fibra, dawna frijoles, cereales integrales, y frutas y verduras frescas.  Limitar el consumo de alimentos ricos en grasa y azúcares procesados, dawna los alimentos fritos o dulces.  Si tiene un aparato ortopédico:    Úselo dawna se lo haya indicado el médico. Quíteselo solamente dawan se lo haya indicado el médico.  Afloje el dispositivo ortopédico si siente hormigueo en los dedos de las swati o de los pies, o si estos se le entumecen o se le enfrían y se tornan de color lakeshia.  Manténgalo limpio.  Si el dispositivo no es impermeable:  No deje que se moje.  Cúbralo con un envoltorio hermético cuando tome un baño de inmersión o nakia ducha.  Control del dolor, la rigidez y la hinchazón      Si se lo indican, aplique hielo sobre la wilner de la lesión. Para hacer esto:  Si usa un dispositivo ortopédico desmontable, quíteselo según las indicaciones del médico.  Ponga el hielo en nakia bolsa plástica.  Coloque nakia toalla entre la piel y la bolsa.  Aplique el hielo maxwell 20 minutos, 2 o 3 veces por día.  Retire el hielo si la piel se pone de color johnson brillante. Canyon Day es muy importante. Si no puede sentir dolor, calor o frío, tiene un mayor riesgo de que se dañe la wilner.  Actividad    Sarina reposo dawna se lo haya indicado el médico.  Evite estar sentado maxwell largos períodos sin moverse. Levántese y camine un poco cada 1 a 2 horas. Canyon Day es importante para mejorar el flujo sanguíneo y la respiración. Pida ayuda si se siente débil o inestable.  Retome dhiraj actividades normales según lo indicado por el médico. Pregunte qué actividades son seguras para usted.  Sarina ejercicios de fisioterapia para mejorar la movilidad y fortalecer la espalda dawna se lo haya recomendado el médico.  Sarina ejercicio regularmente o dawna se lo haya indicado el médico.  Indicaciones generales      No arvind alcohol. El alcohol puede interferir en el tratamiento.  No consuma ningún producto que contenga nicotina o tabaco, dawna cigarrillos, cigarrillos electrónicos y tabaco de mascar. Estos pueden retrasar la consolidación del hueso. Si necesita ayuda para dejar de consumir estos productos, consulte al médico.  Cumpla con todas las visitas de seguimiento. [place] Day es importante. Puede ayudar a evitar las lesiones permanentes, la discapacidad y el dolor prolongado (crónico).  Comuníquese con un médico si:  Tiene fiebre.  El medicamento no le calma el dolor.  El dolor no mejora con el tiempo.  No puede retomar las actividades normales según lo planeado o lo esperado.  Solicite ayuda de inmediato si:  El dolor es muy intenso y empeora repentinamente.  No puede  alguna parte del cuerpo (parálisis) por debajo del nivel de la lesión.  Tiene entumecimiento, hormigueo o debilidad en alguna parte del cuerpo por debajo del nivel de la lesión.  No puede controlar los intestinos o la vejiga.  Resumen  La fractura por aplastamiento vertebral es la quebradura de los huesos que ryan la columna (vértebras).  En mckayla tipo de fractura, las vértebras son aplastadas (se comprimen) y quedan en forma de cuña.  Los síntomas y el tratamiento dependerán de la causa y la gravedad de la fractura y de la rapidez de murry evolución.  Algunas fracturas pueden consolidarse por sí solas con tratamiento de apoyo. Las fracturas que causan deformidad de la espalda, neuralgia o debilidad, o que no responden a otros tratamientos se pueden tratar con cirugía.  Esta información no tiene dawna fin reemplazar el consejo del médico. Asegúrese de hacerle al médico cualquier pregunta que tenga.

## 2023-08-24 NOTE — ED STATDOCS - OBJECTIVE STATEMENT
Pt is a 68y female with a PMH of HTN, LBP w/ sciatica, DM II, hypothyroid, cirrhosis of liver, GERD presents to the ED constant throbbing moderate to severe back pain and R posterior LE numbness radiating down the LE worsened with movement onset 3 weeks, exacerbating recently. PCP MRI 7/29 shows recent moderate L1 w/ mild retropulsion causing mild canal stenosis at T12-L1, multi level DDD causing high grade canal stenosis at L5-S1 and to lesser extent at L4-L5.  Has an outpt appt for 9/12. Cannot take the pain, thus spurring an ED visit instead of waiting for outpt. Was on pain meds w/ no relief. Endorses mild dysuria, HA, and fever, no temperature taken. Denies trauma/recent injury, weakness. On BP meds and Metformin for DM.

## 2023-08-24 NOTE — ED STATDOCS - PATIENT PORTAL LINK FT
You can access the FollowMyHealth Patient Portal offered by [facility] by registering at the following website: [email or web address]. By joining [employer]’s FollowMyHealth portal, you will also be able to view your health information using other applications (apps) compatible with our system.

## 2023-08-24 NOTE — ED STATDOCS - MUSCULOSKELETAL, MLM
MAEx4, no focal swelling/TTP. Decreased sensation in the R LE. B/l paralumbar tender, sciatic notch TTP.

## 2023-08-24 NOTE — ED STATDOCS - CLINICAL SUMMARY MEDICAL DECISION MAKING FREE TEXT BOX
68y female with a PMH of HTN, LBP w/ sciatica, DM II, hypothyroid, cirrhosis of liver, GERD w/ a known lower back disease, outpt MRI L spine shows recent L1 compression fracture. Has known DDD causing spinal stenosis. BIB son c/o worsening back pain, not alleviated by PO meds. Has outpt appt in 3 weeks. Patient poorly ambulatory due to pain, has subjective fever. R LE numbness, no motor weakness.     Plan: Labs including urine w/ culture, UA, blood w/u, IV decadron, morphine, Zofran. Monitor, observe, reassess including ambulation trial. 68y female with a PMH of HTN, LBP w/ sciatica, DM II, hypothyroid, cirrhosis of liver, GERD w/ a known lower back disease, outpt MRI L spine shows recent L1 compression fracture. Has known DDD causing spinal stenosis. BIB son c/o worsening back pain, not alleviated by PO meds. Has outpt appt in 3 weeks. Patient poorly ambulatory due to pain, has subjective fever. R LE numbness, no motor weakness.     Plan: Labs including urine w/ culture, UA, blood w/u, IV decadron, morphine, Zofran. Monitor, observe, reassess including ambulation trial.    01:00, ARIAN Saleh MD:  Labs notable for U/A + UTI with normal WBC (& pt w/o F), otherwise unremarkable.  Informed by ED RN that pt passed ambulation trial.  pain decreased s/p meds, though not resolved.  This & Spine referral d/w pt's son at her bedside, expressed his understanding & agrees with Dc home, po meds as prescribed, outpt PCp & Spine f/u.

## 2023-08-24 NOTE — ED ADULT TRIAGE NOTE - CHIEF COMPLAINT QUOTE
Patient presents to ED ambulatory c/o worsening back pain over past 3 weeks. Pt has an appointment in 3 weeks but she is in too much pain that she can't wait. Denies numbness/tingling.

## 2023-08-25 VITALS
RESPIRATION RATE: 18 BRPM | OXYGEN SATURATION: 98 % | SYSTOLIC BLOOD PRESSURE: 145 MMHG | DIASTOLIC BLOOD PRESSURE: 77 MMHG | TEMPERATURE: 98 F | HEART RATE: 77 BPM

## 2023-08-25 RX ORDER — CYCLOBENZAPRINE HYDROCHLORIDE 10 MG/1
1 TABLET, FILM COATED ORAL
Qty: 16 | Refills: 0
Start: 2023-08-25 | End: 2023-08-28

## 2023-08-25 RX ORDER — TRAMADOL HYDROCHLORIDE 50 MG/1
1 TABLET ORAL
Qty: 9 | Refills: 0
Start: 2023-08-25 | End: 2023-08-27

## 2023-08-25 RX ORDER — CEFUROXIME AXETIL 250 MG
1 TABLET ORAL
Qty: 14 | Refills: 0
Start: 2023-08-25 | End: 2023-08-31

## 2023-08-25 RX ADMIN — CEFTRIAXONE 1000 MILLIGRAM(S): 500 INJECTION, POWDER, FOR SOLUTION INTRAMUSCULAR; INTRAVENOUS at 00:20

## 2023-08-26 LAB
CULTURE RESULTS: SIGNIFICANT CHANGE UP
SPECIMEN SOURCE: SIGNIFICANT CHANGE UP

## 2023-08-30 ENCOUNTER — APPOINTMENT (OUTPATIENT)
Dept: NEUROSURGERY | Facility: CLINIC | Age: 68
End: 2023-08-30
Payer: MEDICAID

## 2023-08-30 VITALS
SYSTOLIC BLOOD PRESSURE: 146 MMHG | WEIGHT: 154 LBS | HEART RATE: 83 BPM | BODY MASS INDEX: 32.32 KG/M2 | DIASTOLIC BLOOD PRESSURE: 90 MMHG | HEIGHT: 58 IN | OXYGEN SATURATION: 95 %

## 2023-08-30 DIAGNOSIS — S32.010A WEDGE COMPRESSION FRACTURE OF FIRST LUMBAR VERTEBRA, INITIAL ENCOUNTER FOR CLOSED FRACTURE: ICD-10-CM

## 2023-08-30 LAB
CULTURE RESULTS: SIGNIFICANT CHANGE UP
CULTURE RESULTS: SIGNIFICANT CHANGE UP
SPECIMEN SOURCE: SIGNIFICANT CHANGE UP
SPECIMEN SOURCE: SIGNIFICANT CHANGE UP

## 2023-08-30 PROCEDURE — 99205 OFFICE O/P NEW HI 60 MIN: CPT

## 2023-08-30 RX ORDER — NAPROXEN 500 MG/1
500 TABLET ORAL
Qty: 60 | Refills: 0 | Status: ACTIVE | COMMUNITY
Start: 2023-08-30 | End: 1900-01-01

## 2023-08-30 RX ORDER — GABAPENTIN 300 MG/1
300 CAPSULE ORAL 3 TIMES DAILY
Qty: 60 | Refills: 0 | Status: ACTIVE | COMMUNITY
Start: 2023-08-30 | End: 1900-01-01

## 2023-08-31 ENCOUNTER — APPOINTMENT (OUTPATIENT)
Dept: RADIOLOGY | Facility: CLINIC | Age: 68
End: 2023-08-31
Payer: MEDICAID

## 2023-08-31 ENCOUNTER — OUTPATIENT (OUTPATIENT)
Dept: OUTPATIENT SERVICES | Facility: HOSPITAL | Age: 68
LOS: 1 days | End: 2023-08-31
Payer: MEDICAID

## 2023-08-31 DIAGNOSIS — M43.16 SPONDYLOLISTHESIS, LUMBAR REGION: ICD-10-CM

## 2023-08-31 DIAGNOSIS — M54.16 RADICULOPATHY, LUMBAR REGION: ICD-10-CM

## 2023-08-31 DIAGNOSIS — S32.010A WEDGE COMPRESSION FRACTURE OF FIRST LUMBAR VERTEBRA, INITIAL ENCOUNTER FOR CLOSED FRACTURE: ICD-10-CM

## 2023-08-31 DIAGNOSIS — Z98.42 CATARACT EXTRACTION STATUS, LEFT EYE: Chronic | ICD-10-CM

## 2023-08-31 DIAGNOSIS — Z98.891 HISTORY OF UTERINE SCAR FROM PREVIOUS SURGERY: Chronic | ICD-10-CM

## 2023-08-31 DIAGNOSIS — Z98.890 OTHER SPECIFIED POSTPROCEDURAL STATES: Chronic | ICD-10-CM

## 2023-08-31 PROCEDURE — 77080 DXA BONE DENSITY AXIAL: CPT | Mod: 26

## 2023-08-31 PROCEDURE — 77080 DXA BONE DENSITY AXIAL: CPT

## 2023-09-05 NOTE — CONSULT LETTER
[Dear  ___] : Dear  [unfilled], [Courtesy Letter:] : I had the pleasure of seeing your patient, [unfilled], in my office today. [Sincerely,] : Sincerely, [FreeTextEntry2] : Nehal Junior MD [address],  [place] [address]  [FreeTextEntry1] : Mrs. Phillip Quintero is a pleasant 68-year-old female patient who was seen in our office today in regards to severe low back pain and bilateral leg symptoms.  The patient is primarily Latvian-speaking and translation was provided by a  service as well as by her son at today's visit.  The patient endorses a 2-month history of severe low back pain and radiating leg pain without any specific inciting event or trauma.  The patient endorses numbness and tingling in the lower extremities chronically preceding her pain but the patient never experienced pain to this degree.  The patient is clearly in significant distress at today's visit.  The patient denies any new bladder or bowel symptoms.  The patient has significant difficulty walking secondary to pain.  On examination, the patient is alert, oriented, and compliant with the exam.  The patient is able to demonstrate gross antigravity movements in the lower extremities bilaterally but a formal examination today is limited by pain.  Similarly, examination with a reflex hammer was significantly hampered by the patient's pain and movements.  The patient is accompanied with an MRI scan of the lumbar spine dated July 29, 2023.  These images demonstrate severe degenerative changes at L2-S1.  Severe foraminal stenosis is noted at L3/4 bilaterally.  Severe central stenosis is noted at L4/5.  A spondylolisthesis is also noted at this level.  Autofusion appears to recurred at the L5/S1 level though previous surgical manipulation is noted from L2-L5. Flexion/extension x-rays were performed on June 19, 2023 which demonstrates severe degenerative changes with a L5/S1 spondylolisthesis and a mild scoliosis. This report uses different nomenclature compared to the CT scan.  Taken together, the patient has a clinical history and radiographic findings most consistent with bilateral lumbar radiculopathy secondary to the patient's spondylolisthesis and stenosis at L3 to L5.  I explained to the patient and her family that surgical intervention would be a consideration should conservative therapies fail.  The patient is scheduled to see a pain management physician in the coming days to attempt more aggressive pain treatment and will consider a surgical option.  I have explained the potential risks and benefits of the procedure to the patient's and her son and explained that a transforaminal lumbar interbody fusion would be required at L3-L5 likely also requiring an open approach given the patient's previous surgery.  The patient will be returning home to consider her options and will follow-up with us as needed. [FreeTextEntry3] : Shane Johnson MD, PhD, CS, FAANS Attending Neurosurgeon  of Neurosurgery [facility] [employer] [facility] at [facility] at [address], [place] [address] Office: [phone] Fax: [phone]

## 2023-09-12 ENCOUNTER — APPOINTMENT (OUTPATIENT)
Dept: PHYSICAL MEDICINE AND REHAB | Facility: CLINIC | Age: 68
End: 2023-09-12
Payer: MEDICAID

## 2023-09-12 VITALS
SYSTOLIC BLOOD PRESSURE: 157 MMHG | OXYGEN SATURATION: 97 % | DIASTOLIC BLOOD PRESSURE: 89 MMHG | BODY MASS INDEX: 32.54 KG/M2 | HEART RATE: 70 BPM | HEIGHT: 58 IN | WEIGHT: 155 LBS

## 2023-09-12 PROCEDURE — 99204 OFFICE O/P NEW MOD 45 MIN: CPT | Mod: GC

## 2023-09-12 RX ORDER — METHOCARBAMOL 500 MG/1
500 TABLET, FILM COATED ORAL
Qty: 30 | Refills: 0 | Status: ACTIVE | COMMUNITY
Start: 2023-09-12 | End: 1900-01-01

## 2023-09-25 ENCOUNTER — INPATIENT (INPATIENT)
Facility: HOSPITAL | Age: 68
LOS: 8 days | Discharge: HOME HEALTH | DRG: 304 | End: 2023-10-04
Attending: NEUROLOGICAL SURGERY | Admitting: NEUROLOGICAL SURGERY
Payer: MEDICAID

## 2023-09-25 VITALS — WEIGHT: 149.91 LBS | HEIGHT: 64 IN

## 2023-09-25 DIAGNOSIS — M43.16 SPONDYLOLISTHESIS, LUMBAR REGION: ICD-10-CM

## 2023-09-25 DIAGNOSIS — Z98.891 HISTORY OF UTERINE SCAR FROM PREVIOUS SURGERY: Chronic | ICD-10-CM

## 2023-09-25 DIAGNOSIS — M48.07 SPINAL STENOSIS, LUMBOSACRAL REGION: ICD-10-CM

## 2023-09-25 DIAGNOSIS — E03.9 HYPOTHYROIDISM, UNSPECIFIED: ICD-10-CM

## 2023-09-25 DIAGNOSIS — Z98.42 CATARACT EXTRACTION STATUS, LEFT EYE: Chronic | ICD-10-CM

## 2023-09-25 DIAGNOSIS — M54.9 DORSALGIA, UNSPECIFIED: ICD-10-CM

## 2023-09-25 DIAGNOSIS — Z98.890 OTHER SPECIFIED POSTPROCEDURAL STATES: Chronic | ICD-10-CM

## 2023-09-25 DIAGNOSIS — M41.56 OTHER SECONDARY SCOLIOSIS, LUMBAR REGION: ICD-10-CM

## 2023-09-25 DIAGNOSIS — E78.5 HYPERLIPIDEMIA, UNSPECIFIED: ICD-10-CM

## 2023-09-25 DIAGNOSIS — E11.9 TYPE 2 DIABETES MELLITUS WITHOUT COMPLICATIONS: ICD-10-CM

## 2023-09-25 DIAGNOSIS — M51.16 INTERVERTEBRAL DISC DISORDERS WITH RADICULOPATHY, LUMBAR REGION: ICD-10-CM

## 2023-09-25 DIAGNOSIS — M48.062 SPINAL STENOSIS, LUMBAR REGION WITH NEUROGENIC CLAUDICATION: ICD-10-CM

## 2023-09-25 DIAGNOSIS — I10 ESSENTIAL (PRIMARY) HYPERTENSION: ICD-10-CM

## 2023-09-25 DIAGNOSIS — K75.4 AUTOIMMUNE HEPATITIS: ICD-10-CM

## 2023-09-25 LAB
ALBUMIN SERPL ELPH-MCNC: 3.9 G/DL — SIGNIFICANT CHANGE UP (ref 3.3–5)
ALP SERPL-CCNC: 58 U/L — SIGNIFICANT CHANGE UP (ref 40–120)
ALT FLD-CCNC: 20 U/L — SIGNIFICANT CHANGE UP (ref 12–78)
ANION GAP SERPL CALC-SCNC: 4 MMOL/L — LOW (ref 5–17)
APPEARANCE UR: CLEAR — SIGNIFICANT CHANGE UP
APTT BLD: 27.8 SEC — SIGNIFICANT CHANGE UP (ref 24.5–35.6)
AST SERPL-CCNC: 14 U/L — LOW (ref 15–37)
BASOPHILS # BLD AUTO: 0.04 K/UL — SIGNIFICANT CHANGE UP (ref 0–0.2)
BASOPHILS NFR BLD AUTO: 0.7 % — SIGNIFICANT CHANGE UP (ref 0–2)
BILIRUB SERPL-MCNC: 0.7 MG/DL — SIGNIFICANT CHANGE UP (ref 0.2–1.2)
BILIRUB UR-MCNC: NEGATIVE — SIGNIFICANT CHANGE UP
BUN SERPL-MCNC: 7 MG/DL — SIGNIFICANT CHANGE UP (ref 7–23)
CALCIUM SERPL-MCNC: 8.9 MG/DL — SIGNIFICANT CHANGE UP (ref 8.5–10.1)
CHLORIDE SERPL-SCNC: 101 MMOL/L — SIGNIFICANT CHANGE UP (ref 96–108)
CO2 SERPL-SCNC: 32 MMOL/L — HIGH (ref 22–31)
COLOR SPEC: YELLOW — SIGNIFICANT CHANGE UP
CREAT SERPL-MCNC: 0.69 MG/DL — SIGNIFICANT CHANGE UP (ref 0.5–1.3)
DIFF PNL FLD: NEGATIVE — SIGNIFICANT CHANGE UP
EGFR: 94 ML/MIN/1.73M2 — SIGNIFICANT CHANGE UP
EOSINOPHIL # BLD AUTO: 0.12 K/UL — SIGNIFICANT CHANGE UP (ref 0–0.5)
EOSINOPHIL NFR BLD AUTO: 2.2 % — SIGNIFICANT CHANGE UP (ref 0–6)
EPI CELLS # UR: SIGNIFICANT CHANGE UP
GLUCOSE SERPL-MCNC: 144 MG/DL — HIGH (ref 70–99)
GLUCOSE UR QL: NEGATIVE — SIGNIFICANT CHANGE UP
HCT VFR BLD CALC: 37.2 % — SIGNIFICANT CHANGE UP (ref 34.5–45)
HCT VFR BLD CALC: 38.7 % — SIGNIFICANT CHANGE UP (ref 34.5–45)
HGB BLD-MCNC: 12.9 G/DL — SIGNIFICANT CHANGE UP (ref 11.5–15.5)
HGB BLD-MCNC: 13.2 G/DL — SIGNIFICANT CHANGE UP (ref 11.5–15.5)
IMM GRANULOCYTES NFR BLD AUTO: 0.4 % — SIGNIFICANT CHANGE UP (ref 0–0.9)
INR BLD: 0.95 RATIO — SIGNIFICANT CHANGE UP (ref 0.85–1.18)
KETONES UR-MCNC: NEGATIVE — SIGNIFICANT CHANGE UP
LACTATE SERPL-SCNC: 1.3 MMOL/L — SIGNIFICANT CHANGE UP (ref 0.7–2)
LEUKOCYTE ESTERASE UR-ACNC: ABNORMAL
LIDOCAIN IGE QN: 60 U/L — SIGNIFICANT CHANGE UP (ref 13–75)
LYMPHOCYTES # BLD AUTO: 1.36 K/UL — SIGNIFICANT CHANGE UP (ref 1–3.3)
LYMPHOCYTES # BLD AUTO: 24.4 % — SIGNIFICANT CHANGE UP (ref 13–44)
MAGNESIUM SERPL-MCNC: 1.9 MG/DL — SIGNIFICANT CHANGE UP (ref 1.6–2.6)
MCHC RBC-ENTMCNC: 30.4 PG — SIGNIFICANT CHANGE UP (ref 27–34)
MCHC RBC-ENTMCNC: 31 PG — SIGNIFICANT CHANGE UP (ref 27–34)
MCHC RBC-ENTMCNC: 34.1 GM/DL — SIGNIFICANT CHANGE UP (ref 32–36)
MCHC RBC-ENTMCNC: 34.7 GM/DL — SIGNIFICANT CHANGE UP (ref 32–36)
MCV RBC AUTO: 89.2 FL — SIGNIFICANT CHANGE UP (ref 80–100)
MCV RBC AUTO: 89.4 FL — SIGNIFICANT CHANGE UP (ref 80–100)
MONOCYTES # BLD AUTO: 0.32 K/UL — SIGNIFICANT CHANGE UP (ref 0–0.9)
MONOCYTES NFR BLD AUTO: 5.7 % — SIGNIFICANT CHANGE UP (ref 2–14)
NEUTROPHILS # BLD AUTO: 3.71 K/UL — SIGNIFICANT CHANGE UP (ref 1.8–7.4)
NEUTROPHILS NFR BLD AUTO: 66.6 % — SIGNIFICANT CHANGE UP (ref 43–77)
NITRITE UR-MCNC: NEGATIVE — SIGNIFICANT CHANGE UP
PH UR: 7 — SIGNIFICANT CHANGE UP (ref 5–8)
PLATELET # BLD AUTO: 337 K/UL — SIGNIFICANT CHANGE UP (ref 150–400)
PLATELET # BLD AUTO: 344 K/UL — SIGNIFICANT CHANGE UP (ref 150–400)
POTASSIUM SERPL-MCNC: 3.5 MMOL/L — SIGNIFICANT CHANGE UP (ref 3.5–5.3)
POTASSIUM SERPL-SCNC: 3.5 MMOL/L — SIGNIFICANT CHANGE UP (ref 3.5–5.3)
PROT SERPL-MCNC: 7.3 GM/DL — SIGNIFICANT CHANGE UP (ref 6–8.3)
PROT UR-MCNC: 15
PROTHROM AB SERPL-ACNC: 10.8 SEC — SIGNIFICANT CHANGE UP (ref 9.5–13)
RAPID RVP RESULT: SIGNIFICANT CHANGE UP
RBC # BLD: 4.16 M/UL — SIGNIFICANT CHANGE UP (ref 3.8–5.2)
RBC # BLD: 4.34 M/UL — SIGNIFICANT CHANGE UP (ref 3.8–5.2)
RBC # FLD: 13.4 % — SIGNIFICANT CHANGE UP (ref 10.3–14.5)
RBC # FLD: 13.5 % — SIGNIFICANT CHANGE UP (ref 10.3–14.5)
RBC CASTS # UR COMP ASSIST: NEGATIVE /HPF — SIGNIFICANT CHANGE UP (ref 0–4)
SARS-COV-2 RNA SPEC QL NAA+PROBE: SIGNIFICANT CHANGE UP
SODIUM SERPL-SCNC: 137 MMOL/L — SIGNIFICANT CHANGE UP (ref 135–145)
SP GR SPEC: 1 — LOW (ref 1.01–1.02)
UROBILINOGEN FLD QL: NEGATIVE — SIGNIFICANT CHANGE UP
WBC # BLD: 5.57 K/UL — SIGNIFICANT CHANGE UP (ref 3.8–10.5)
WBC # BLD: 5.58 K/UL — SIGNIFICANT CHANGE UP (ref 3.8–10.5)
WBC # FLD AUTO: 5.57 K/UL — SIGNIFICANT CHANGE UP (ref 3.8–10.5)
WBC # FLD AUTO: 5.58 K/UL — SIGNIFICANT CHANGE UP (ref 3.8–10.5)
WBC UR QL: SIGNIFICANT CHANGE UP /HPF (ref 0–5)

## 2023-09-25 PROCEDURE — 85730 THROMBOPLASTIN TIME PARTIAL: CPT

## 2023-09-25 PROCEDURE — 81003 URINALYSIS AUTO W/O SCOPE: CPT

## 2023-09-25 PROCEDURE — 99254 IP/OBS CNSLTJ NEW/EST MOD 60: CPT

## 2023-09-25 PROCEDURE — 86901 BLOOD TYPING SEROLOGIC RH(D): CPT

## 2023-09-25 PROCEDURE — 80053 COMPREHEN METABOLIC PANEL: CPT

## 2023-09-25 PROCEDURE — 84100 ASSAY OF PHOSPHORUS: CPT

## 2023-09-25 PROCEDURE — 93306 TTE W/DOPPLER COMPLETE: CPT

## 2023-09-25 PROCEDURE — 83735 ASSAY OF MAGNESIUM: CPT

## 2023-09-25 PROCEDURE — 93005 ELECTROCARDIOGRAM TRACING: CPT

## 2023-09-25 PROCEDURE — 80048 BASIC METABOLIC PNL TOTAL CA: CPT

## 2023-09-25 PROCEDURE — 85610 PROTHROMBIN TIME: CPT

## 2023-09-25 PROCEDURE — 83036 HEMOGLOBIN GLYCOSYLATED A1C: CPT

## 2023-09-25 PROCEDURE — 97116 GAIT TRAINING THERAPY: CPT | Mod: GP

## 2023-09-25 PROCEDURE — 36415 COLL VENOUS BLD VENIPUNCTURE: CPT

## 2023-09-25 PROCEDURE — 86850 RBC ANTIBODY SCREEN: CPT

## 2023-09-25 PROCEDURE — 99285 EMERGENCY DEPT VISIT HI MDM: CPT

## 2023-09-25 PROCEDURE — 82962 GLUCOSE BLOOD TEST: CPT

## 2023-09-25 PROCEDURE — 86900 BLOOD TYPING SEROLOGIC ABO: CPT

## 2023-09-25 PROCEDURE — C1889: CPT

## 2023-09-25 PROCEDURE — 71045 X-RAY EXAM CHEST 1 VIEW: CPT | Mod: 26

## 2023-09-25 PROCEDURE — C1713: CPT

## 2023-09-25 PROCEDURE — 85027 COMPLETE CBC AUTOMATED: CPT

## 2023-09-25 PROCEDURE — 97163 PT EVAL HIGH COMPLEX 45 MIN: CPT | Mod: GP

## 2023-09-25 PROCEDURE — 76000 FLUOROSCOPY <1 HR PHYS/QHP: CPT

## 2023-09-25 PROCEDURE — 86803 HEPATITIS C AB TEST: CPT

## 2023-09-25 RX ORDER — OXYCODONE HYDROCHLORIDE 5 MG/1
5 TABLET ORAL EVERY 4 HOURS
Refills: 0 | Status: DISCONTINUED | OUTPATIENT
Start: 2023-09-25 | End: 2023-09-29

## 2023-09-25 RX ORDER — SODIUM CHLORIDE 9 MG/ML
1000 INJECTION INTRAMUSCULAR; INTRAVENOUS; SUBCUTANEOUS ONCE
Refills: 0 | Status: COMPLETED | OUTPATIENT
Start: 2023-09-25 | End: 2023-09-25

## 2023-09-25 RX ORDER — CYCLOBENZAPRINE HYDROCHLORIDE 10 MG/1
10 TABLET, FILM COATED ORAL THREE TIMES A DAY
Refills: 0 | Status: DISCONTINUED | OUTPATIENT
Start: 2023-09-25 | End: 2023-10-04

## 2023-09-25 RX ORDER — MORPHINE SULFATE 50 MG/1
2 CAPSULE, EXTENDED RELEASE ORAL ONCE
Refills: 0 | Status: DISCONTINUED | OUTPATIENT
Start: 2023-09-25 | End: 2023-09-25

## 2023-09-25 RX ORDER — ONDANSETRON 8 MG/1
4 TABLET, FILM COATED ORAL EVERY 6 HOURS
Refills: 0 | Status: DISCONTINUED | OUTPATIENT
Start: 2023-09-25 | End: 2023-10-04

## 2023-09-25 RX ORDER — ENOXAPARIN SODIUM 100 MG/ML
40 INJECTION SUBCUTANEOUS EVERY 24 HOURS
Refills: 0 | Status: DISCONTINUED | OUTPATIENT
Start: 2023-09-26 | End: 2023-09-28

## 2023-09-25 RX ORDER — MORPHINE SULFATE 50 MG/1
2 CAPSULE, EXTENDED RELEASE ORAL EVERY 4 HOURS
Refills: 0 | Status: DISCONTINUED | OUTPATIENT
Start: 2023-09-25 | End: 2023-09-25

## 2023-09-25 RX ORDER — ACETAMINOPHEN 500 MG
650 TABLET ORAL EVERY 6 HOURS
Refills: 0 | Status: DISCONTINUED | OUTPATIENT
Start: 2023-09-25 | End: 2023-10-04

## 2023-09-25 RX ADMIN — MORPHINE SULFATE 2 MILLIGRAM(S): 50 CAPSULE, EXTENDED RELEASE ORAL at 13:14

## 2023-09-25 RX ADMIN — OXYCODONE HYDROCHLORIDE 5 MILLIGRAM(S): 5 TABLET ORAL at 21:38

## 2023-09-25 RX ADMIN — SODIUM CHLORIDE 1000 MILLILITER(S): 9 INJECTION INTRAMUSCULAR; INTRAVENOUS; SUBCUTANEOUS at 13:14

## 2023-09-25 RX ADMIN — OXYCODONE HYDROCHLORIDE 5 MILLIGRAM(S): 5 TABLET ORAL at 21:56

## 2023-09-25 NOTE — ED STATDOCS - MUSCULOSKELETAL, MLM
Pain in her lower back radiating down her right leg. Negative straight leg raise. Motor 5/5 both lower extremities.

## 2023-09-25 NOTE — ED STATDOCS - PROGRESS NOTE DETAILS
69 y/o female with PMHx of spondylosis, metabolic syndrome, hypothyroidism, autoimmune hepatitis, HLD, DM Type II, HTN presents to the ED c/o worsening back pain radiating to her legs x1 month, with new onset body aches and headache x2 days. Patient with lower back surgery scheduled for 10/23 with Dr. Shane Johnson, however presents for management of unbearable pain. spoke with neurosurgery team, will see pt. ~Will Obrien PA-C Patient with persistent back pain. Neurosurgery team will admit patient. ~Will Obrien PA-C

## 2023-09-25 NOTE — CONSULT NOTE ADULT - ASSESSMENT
67 yo F hx of spinal stenosis and foraminal stenosis with severe radiculopathy scheduled for surgery 10/23 with Dr. Johnson but patient states pain is intolerable.  67 yo F hx of spinal stenosis and foraminal stenosis with severe radiculopathy scheduled for surgery 10/23 with Dr. Johnson but patient states pain is intolerable. Plan to admit to Dr. Johnson Neurousurgery and book for surgery in the coming days. She will need medical clearance prior to surgery.     - Admit to Neurosurgery  - Pain control  - Medicine consult for clearance     D/w Dr. Johnson

## 2023-09-25 NOTE — ED STATDOCS - OBJECTIVE STATEMENT
69 y/o female with PMHx of spondylosis, metabolic syndrome, hypothyroidism, autoimmune hepatitis, HLD, DM Type II, HTN presents to the ED c/o worsening back pain radiating to her legs x1 month, with new onset body aches and headache x2 days. Patient with lower back surgery scheduled for 10/23 with Dr. Shane Johnson, however presents for management of unbearable pain.

## 2023-09-25 NOTE — H&P ADULT - NSHPLABSRESULTS_GEN_ALL_CORE
ICU Vital Signs Last 24 Hrs  T(C): 36.4 (25 Sep 2023 13:21), Max: 36.6 (25 Sep 2023 12:28)  T(F): 97.5 (25 Sep 2023 13:21), Max: 97.9 (25 Sep 2023 12:28)  HR: 73 (25 Sep 2023 13:21) (69 - 73)  BP: 165/80 (25 Sep 2023 13:21) (165/80 - 180/73)  BP(mean): 103 (25 Sep 2023 12:28) (103 - 103)  ABP: --  ABP(mean): --  RR: 18 (25 Sep 2023 13:21) (18 - 18)  SpO2: 99% (25 Sep 2023 13:21) (98% - 99%)    O2 Parameters below as of 25 Sep 2023 13:21  Patient On (Oxygen Delivery Method): room air

## 2023-09-25 NOTE — PATIENT PROFILE ADULT - HISTORY OF COVID-19 VACCINATION
Quality 130: Documentation Of Current Medications In The Medical Record: Current Medications Documented Detail Level: Detailed Quality 110: Preventive Care And Screening: Influenza Immunization: Influenza Immunization not Administered because Patient Refused. Vaccine status unknown

## 2023-09-25 NOTE — ED STATDOCS - ATTENDING APP SHARED VISIT CONTRIBUTION OF CARE
I personally saw the patient with the GLORIA, and completed the key components of the history and physical exam. I then discussed the management plan with the LGORIA.

## 2023-09-25 NOTE — PATIENT PROFILE ADULT - FALL HARM RISK - HARM RISK INTERVENTIONS

## 2023-09-25 NOTE — H&P ADULT - ASSESSMENT
69 yo F hx of spinal stenosis and foraminal stenosis with severe radiculopathy scheduled for surgery 10/23 with Dr. Johnson but patient states pain is intolerable. Plan to admit to Dr. Johnson Neurousurgery and book for surgery in the coming days. She will need medical clearance prior to surgery.     - Admit to Neurosurgery  - Pain control  - Medicine consult for clearance   - Labs: CBC, BMP, Coags, UA     D/w Dr. Johnson LVM with patient: need to check INR

## 2023-09-25 NOTE — ED ADULT NURSE NOTE - NSFALLUNIVINTERV_ED_ALL_ED
Bed/Stretcher in lowest position, wheels locked, appropriate side rails in place/Call bell, personal items and telephone in reach/Instruct patient to call for assistance before getting out of bed/chair/stretcher/Non-slip footwear applied when patient is off stretcher/[place] to call system/Physically safe environment - no spills, clutter or unnecessary equipment/Purposeful proactive rounding/Room/bathroom lighting operational, light cord in reach

## 2023-09-25 NOTE — H&P ADULT - NSHPPHYSICALEXAM_GEN_ALL_CORE
PE:   Awake, interactive, Upper sorbian speaking   RLE Q 5 IP 5 PF 5 DF 5   LLE Q 5 IP 5 PF 5 DF 5   Positive straight leg raise bilaterally   + JPS  No clonus   Hyperreflexia in b/l LE

## 2023-09-25 NOTE — H&P ADULT - HISTORY OF PRESENT ILLNESS
69 yo F hx of back pain, hypothyroidism, autoimmune hepatitis, HLD, DM Type II, HTN, degenerative disc disease and spinal stenosis is presenting with intolerable bilateral lower extremity pain and back pain x months. She was recently seen in the ED in September for similar symptoms but was discharged home with pain meds and instructed to follow up at her schedule appointment outpatient with Dr. Johnson. Her MRI at the time revealed multilevel degenerative disc disease, high grade stenosis at L5-S1 and marked foraminal stenosis at L4-L5. She is scheduled for surgery on 10/23 with Dr. Johnson but she states she cannot wait that long because the pain has become unbearable. She has been taking Gabapentin, Flexeril and Naproxen at home without relief. The pain is not better or worse with change in position. She states it is constant and wakes her up at night. She has no urinary retention or fecal incontinence.

## 2023-09-25 NOTE — ED STATDOCS - PHYSICAL EXAMINATION
PA NOTE: GEN: AOX3, NAD. HEENT: Throat clear. Airway is patent. EYES: PERRLA. EOMI. Head: NC/AT. NECK: Supple, No JVD. FROM. C-spine non-tender. CV:S1S2, RRR, LUNGS: Non-labored breathing, no tachypnea. O2sat 100% RA. CTA b/l. No w/r/r. CHEST: Equal chest expansion and rise. No deformity. ABD: Soft, NT/ND, no rebound, no guarding. No CVAT. EXT: No e/c/c. 2+ distal pulses. SKIN: No rashes. NEURO: No focal deficits. CN II-XII intact. FROM. 5/5 motor and sensory. ~Will Obrien PA-C

## 2023-09-25 NOTE — ED ADULT TRIAGE NOTE - CHIEF COMPLAINT QUOTE
PT C/O LOWER BACK PAIN. S/P FRACTURE 25 YEARS AGO. DENIES INJURY. DUE FOR BACK SURGERY 10/2023. PMH: HEPATITIS, HBP, DMII

## 2023-09-25 NOTE — ED STATDOCS - CLINICAL SUMMARY MEDICAL DECISION MAKING FREE TEXT BOX
67 y/o female with PMHx of HTN, DM presents with worsening back pain, scheduled for surgery next month. Will check labs including viral panel and discuss with neurosurgery PA possible next step. 69 y/o female with PMHx of HTN, DM presents with worsening back pain, scheduled for surgery next month. Will check labs including viral panel and discuss with neurosurgery PA possible next step.    Patient with persistent back pain. Neurosurgery team will admit patient. ~Will Obrien PA-C

## 2023-09-25 NOTE — CONSULT NOTE ADULT - SUBJECTIVE AND OBJECTIVE BOX
69 yo F hx of back pain, hypothyroidism, autoimmune hepatitis, HLD, DM Type II, HTN, degenerative disc disease and spinal stenosis is presenting with intolerable bilateral lower extremity pain and back pain x months. She was recently seen in the ED in September for similar symptoms but was discharged home with pain meds and instructed to follow up at her schedule appointment outpatient with Dr. Johnson. Her MRI at the time revealed multilevel degenerative disc disease, high grade stenosis at L5-S1 and marked foraminal stenosis at L4-L5. She is scheduled for surgery on 10/23 with Dr. Johnson but she states she cannot wait that long because the pain has become unbearable. She has been taking Gabapentin, Flexeril and Naproxen at home without relief. The pain is not better or worse with change in position. She states it is constant and wakes her up at night. She has no urinary retention or fecal incontinence.     ICU Vital Signs Last 24 Hrs  T(C): 36.4 (25 Sep 2023 13:21), Max: 36.6 (25 Sep 2023 12:28)  T(F): 97.5 (25 Sep 2023 13:21), Max: 97.9 (25 Sep 2023 12:28)  HR: 73 (25 Sep 2023 13:21) (69 - 73)  BP: 165/80 (25 Sep 2023 13:21) (165/80 - 180/73)  BP(mean): 103 (25 Sep 2023 12:28) (103 - 103)  ABP: --  ABP(mean): --  RR: 18 (25 Sep 2023 13:21) (18 - 18)  SpO2: 99% (25 Sep 2023 13:21) (98% - 99%)    O2 Parameters below as of 25 Sep 2023 13:21  Patient On (Oxygen Delivery Method): room air    PE:   Awake, interactive, Dominican speaking   RLE Q 5 IP 5 PF 5 DF 5   LLE Q 5 IP 5 PF 5 DF 5   Positive straight leg raise bilaterally   + JPS  No clonus   Hyperreflexia in b/l LE

## 2023-09-26 LAB
ALBUMIN SERPL ELPH-MCNC: 4.2 G/DL — SIGNIFICANT CHANGE UP (ref 3.3–5)
ALP SERPL-CCNC: 59 U/L — SIGNIFICANT CHANGE UP (ref 40–120)
ALT FLD-CCNC: 21 U/L — SIGNIFICANT CHANGE UP (ref 12–78)
ANION GAP SERPL CALC-SCNC: 4 MMOL/L — LOW (ref 5–17)
AST SERPL-CCNC: 14 U/L — LOW (ref 15–37)
BILIRUB SERPL-MCNC: 0.6 MG/DL — SIGNIFICANT CHANGE UP (ref 0.2–1.2)
BUN SERPL-MCNC: 10 MG/DL — SIGNIFICANT CHANGE UP (ref 7–23)
CALCIUM SERPL-MCNC: 9.4 MG/DL — SIGNIFICANT CHANGE UP (ref 8.5–10.1)
CHLORIDE SERPL-SCNC: 103 MMOL/L — SIGNIFICANT CHANGE UP (ref 96–108)
CO2 SERPL-SCNC: 29 MMOL/L — SIGNIFICANT CHANGE UP (ref 22–31)
CREAT SERPL-MCNC: 0.72 MG/DL — SIGNIFICANT CHANGE UP (ref 0.5–1.3)
EGFR: 90 ML/MIN/1.73M2 — SIGNIFICANT CHANGE UP
GLUCOSE BLDC GLUCOMTR-MCNC: 110 MG/DL — HIGH (ref 70–99)
GLUCOSE BLDC GLUCOMTR-MCNC: 111 MG/DL — HIGH (ref 70–99)
GLUCOSE BLDC GLUCOMTR-MCNC: 93 MG/DL — SIGNIFICANT CHANGE UP (ref 70–99)
GLUCOSE SERPL-MCNC: 135 MG/DL — HIGH (ref 70–99)
PHOSPHATE SERPL-MCNC: 3.2 MG/DL — SIGNIFICANT CHANGE UP (ref 2.5–4.5)
POTASSIUM SERPL-MCNC: 3.6 MMOL/L — SIGNIFICANT CHANGE UP (ref 3.5–5.3)
POTASSIUM SERPL-SCNC: 3.6 MMOL/L — SIGNIFICANT CHANGE UP (ref 3.5–5.3)
PROT SERPL-MCNC: 7.9 GM/DL — SIGNIFICANT CHANGE UP (ref 6–8.3)
SODIUM SERPL-SCNC: 136 MMOL/L — SIGNIFICANT CHANGE UP (ref 135–145)

## 2023-09-26 PROCEDURE — 99231 SBSQ HOSP IP/OBS SF/LOW 25: CPT

## 2023-09-26 PROCEDURE — 99253 IP/OBS CNSLTJ NEW/EST LOW 45: CPT

## 2023-09-26 RX ORDER — DEXTROSE 50 % IN WATER 50 %
15 SYRINGE (ML) INTRAVENOUS ONCE
Refills: 0 | Status: DISCONTINUED | OUTPATIENT
Start: 2023-09-26 | End: 2023-09-27

## 2023-09-26 RX ORDER — DEXTROSE 50 % IN WATER 50 %
25 SYRINGE (ML) INTRAVENOUS ONCE
Refills: 0 | Status: DISCONTINUED | OUTPATIENT
Start: 2023-09-26 | End: 2023-09-27

## 2023-09-26 RX ORDER — METOPROLOL TARTRATE 50 MG
1 TABLET ORAL
Refills: 0 | DISCHARGE

## 2023-09-26 RX ORDER — INSULIN LISPRO 100/ML
VIAL (ML) SUBCUTANEOUS
Refills: 0 | Status: DISCONTINUED | OUTPATIENT
Start: 2023-09-26 | End: 2023-09-27

## 2023-09-26 RX ORDER — METFORMIN HYDROCHLORIDE 850 MG/1
1 TABLET ORAL
Refills: 0 | DISCHARGE

## 2023-09-26 RX ORDER — GLUCAGON INJECTION, SOLUTION 0.5 MG/.1ML
1 INJECTION, SOLUTION SUBCUTANEOUS ONCE
Refills: 0 | Status: DISCONTINUED | OUTPATIENT
Start: 2023-09-26 | End: 2023-09-27

## 2023-09-26 RX ORDER — AZATHIOPRINE 100 MG/1
1 TABLET ORAL
Refills: 0 | DISCHARGE

## 2023-09-26 RX ORDER — SODIUM CHLORIDE 9 MG/ML
1000 INJECTION, SOLUTION INTRAVENOUS
Refills: 0 | Status: DISCONTINUED | OUTPATIENT
Start: 2023-09-26 | End: 2023-09-27

## 2023-09-26 RX ORDER — ALENDRONATE SODIUM 70 MG/1
1 TABLET ORAL
Refills: 0 | DISCHARGE

## 2023-09-26 RX ORDER — URSODIOL 250 MG/1
1 TABLET, FILM COATED ORAL
Refills: 0 | DISCHARGE

## 2023-09-26 RX ORDER — LEVOTHYROXINE SODIUM 125 MCG
1 TABLET ORAL
Refills: 0 | DISCHARGE

## 2023-09-26 RX ORDER — DEXTROSE 50 % IN WATER 50 %
12.5 SYRINGE (ML) INTRAVENOUS ONCE
Refills: 0 | Status: DISCONTINUED | OUTPATIENT
Start: 2023-09-26 | End: 2023-09-27

## 2023-09-26 RX ORDER — LOSARTAN POTASSIUM 100 MG/1
1 TABLET, FILM COATED ORAL
Refills: 0 | DISCHARGE

## 2023-09-26 RX ORDER — GABAPENTIN 400 MG/1
2 CAPSULE ORAL
Refills: 0 | DISCHARGE

## 2023-09-26 RX ORDER — BUDESONIDE, MICRONIZED 100 %
1 POWDER (GRAM) MISCELLANEOUS
Refills: 0 | DISCHARGE

## 2023-09-26 RX ADMIN — OXYCODONE HYDROCHLORIDE 5 MILLIGRAM(S): 5 TABLET ORAL at 03:04

## 2023-09-26 RX ADMIN — ENOXAPARIN SODIUM 40 MILLIGRAM(S): 100 INJECTION SUBCUTANEOUS at 11:07

## 2023-09-26 RX ADMIN — OXYCODONE HYDROCHLORIDE 5 MILLIGRAM(S): 5 TABLET ORAL at 22:02

## 2023-09-26 RX ADMIN — OXYCODONE HYDROCHLORIDE 5 MILLIGRAM(S): 5 TABLET ORAL at 03:34

## 2023-09-26 RX ADMIN — OXYCODONE HYDROCHLORIDE 5 MILLIGRAM(S): 5 TABLET ORAL at 11:55

## 2023-09-26 RX ADMIN — OXYCODONE HYDROCHLORIDE 5 MILLIGRAM(S): 5 TABLET ORAL at 22:23

## 2023-09-26 RX ADMIN — OXYCODONE HYDROCHLORIDE 5 MILLIGRAM(S): 5 TABLET ORAL at 11:07

## 2023-09-26 NOTE — PHARMACOTHERAPY INTERVENTION NOTE - INTERVENTION CATEGORIES
Vaginitis: Care Instructions  Your Care Instructions    Vaginitis is soreness or infection of the vagina. This common problem can cause itching and burning. And it can cause a change in vaginal discharge. Sometimes it can cause pain during sex. Vaginitis may be caused by bacteria, yeast, or other germs. Some infections that cause it are caught from a sexual partner. Bath products, spermicides, and douches can irritate the vagina too. Some women have this problem during and after menopause. A drop in estrogen levels during this time can cause dryness, soreness, and pain during sex. Your doctor can give you medicine to treat an infection. And home care may help you feel better. For certain types of infections, your sex partner must be treated too. Follow-up care is a key part of your treatment and safety. Be sure to make and go to all appointments, and call your doctor if you are having problems. It's also a good idea to know your test results and keep a list of the medicines you take. How can you care for yourself at home? · If your doctor prescribed antibiotics, take them as directed. Do not stop taking them just because you feel better. You need to take the full course of antibiotics. · Take your medicines exactly as prescribed. Call your doctor if you think you are having a problem with your medicine. · Do not eat or drink anything that has alcohol if you are taking metronidazole (Flagyl). · If you have a yeast infection, use over-the-counter products as your doctor tells you to. Or take medicine your doctor prescribes exactly as directed. · Wash your vaginal area daily with water. You also can use a mild, unscented soap if you want. · Do not use scented bath products. And do not use vaginal sprays or douches. · Put a washcloth soaked in cool water on the area to relieve itching. Or you can take cool baths.   · If you have dryness because of menopause, use estrogen cream or pills that your doctor prescribes. · Ask your doctor about when it is okay to have sex. · Use a personal lubricant before sex if you have dryness. Examples are Astroglide, K-Y Jelly, and Wet Lubricant Gel. · Ask your doctor if your sex partner also needs treatment. When should you call for help? Call your doctor now or seek immediate medical care if:  · You have a fever and pelvic pain. Watch closely for changes in your health, and be sure to contact your doctor if:  · You have bleeding other than your period. · You do not get better as expected. Where can you learn more? Go to http://gloria-penelope.info/. Enter V400 in the search box to learn more about \"Vaginitis: Care Instructions. \"  Current as of: February 25, 2016  Content Version: 11.1  © 8771-2992 [employer]. Care instructions adapted under license by [employer] (which disclaims liability or warranty for this information). If you have questions about a medical condition or this instruction, always ask your healthcare professional. [address] any warranty or liability for your use of this information. Med Reconciliation

## 2023-09-26 NOTE — PROGRESS NOTE ADULT - ASSESSMENT
69 yo F hx of spinal stenosis and foraminal stenosis with severe radiculopathy scheduled for surgery 10/23 with Dr. Johnson but patient states pain is intolerable. Plan to admit to Dr. Johnson Neurousurgery and book for surgery in the coming days. She will need medical clearance prior to surgery.     #Severe lumbar stenosis with radicular pain   #Hyothyroid  #HTN  #DM type II  #Autoimmune Hepatitis     PLAN-  Surgery is schedule for Friday 9/29  Hospitalist consult for medical clearance   Continue pain control as needed   SQ heparin for DVT PPX till surgery   Incentive spirometer   Consistent carbohydrate diet and sliding scale insulin as needed   Continue home Blood pressure medications   Continue Synthroid     Discussed with Dr. Johnson who agrees with plan

## 2023-09-26 NOTE — CONSULT NOTE ADULT - SUBJECTIVE AND OBJECTIVE BOX
C/c:  back pain with radiation to b/l LE    HPI: 68F, pmh of HTN, HLD, DMII, AI hepatitis, Hypothyroidism, Spinal stenosis who           9/26- Pt seen and examined on 2N, no events overnight, states she didn't sleep well, still c/o back pain and still c/o pain in her anterior thighs, surgery is scheduled for this coming Friday.     Vital Signs Last 24 Hrs  T(C): 36.6 (26 Sep 2023 08:04), Max: 37 (25 Sep 2023 16:55)  T(F): 97.9 (26 Sep 2023 08:04), Max: 98.6 (25 Sep 2023 16:55)  HR: 76 (26 Sep 2023 08:04) (56 - 76)  BP: 138/76 (26 Sep 2023 08:04) (133/65 - 180/73)  BP(mean): 82 (25 Sep 2023 16:55) (82 - 103)  RR: 16 (26 Sep 2023 08:04) (16 - 18)  SpO2: 99% (26 Sep 2023 08:04) (97% - 99%)    Parameters below as of 26 Sep 2023 08:04  Patient On (Oxygen Delivery Method): room air    MEDICATIONS  (STANDING):  enoxaparin Injectable 40 milliGRAM(s) SubCutaneous every 24 hours    MEDICATIONS  (PRN):  acetaminophen     Tablet .. 650 milliGRAM(s) Oral every 6 hours PRN Mild Pain (1 - 3)  cyclobenzaprine 10 milliGRAM(s) Oral three times a day PRN Muscle Spasm  morphine  - Injectable 2 milliGRAM(s) IV Push every 4 hours PRN Severe Pain (7 - 10)  ondansetron Injectable 4 milliGRAM(s) IV Push every 6 hours PRN Nausea and/or Vomiting  oxyCODONE    IR 5 milliGRAM(s) Oral every 4 hours PRN Moderate Pain (4 - 6)    ROS: pertinent positives in HPI, all other ROS were reviewed and are negative     PHYSICAL EXAM:  Constitutional: awake and alert  HEENT: PERRLA, EOMI, hearing intact   Neck: Supple.  Respiratory: Breath sounds are clear bilaterally  Cardiovascular: S1 and S2, regular rhythm  Gastrointestinal: soft, nontender, obese   Extremities:  no edema  Vascular: Carotid Bruit - no  Musculoskeletal: + straight leg raise b/l   Skin: No rashes  Psych: normal affect, participates in exam     Neurological exam:  HF: A x O x 3. Appropriately interactive, normal affect. Speech fluent in Kazakh, no Aphasia or paraphasic errors. Naming /repetition intact   CN: MARGARET, EOMI, VFF, facial sensation normal, no NLFD, tongue midline, Palate moves equally, SCM equal bilaterally  Motor: No pronator drift, Strength 5/5 in all 4 ext, normal bulk and tone, no tremor, rigidity or bradykinesia.    Sens: Intact to light touch  Reflexes: Symmetric and normal, downgoing toes b/l, no clonus   Coord:  No FNFA, dysmetria, KRISTI intact   Gait/Balance: Not tested       LABS:                         12.9   5.58  )-----------( 337      ( 25 Sep 2023 17:49 )             37.2     09-26    136  |  103  |  10  ----------------------------<  135<H>  3.6   |  29  |  0.72    Ca    9.4      26 Sep 2023 07:17  Phos  3.2     09-26  Mg     1.9     09-25    TPro  7.9  /  Alb  4.2  /  TBili  0.6  /  DBili  x   /  AST  14<L>  /  ALT  21  /  AlkPhos  59  09-26    LIVER FUNCTIONS - ( 26 Sep 2023 07:17 )  Alb: 4.2 g/dL / Pro: 7.9 gm/dL / ALK PHOS: 59 U/L / ALT: 21 U/L / AST: 14 U/L / GGT: x           RADIOLOGY:  outside imaging reviewed         Assessment and Plan:   · Assessment	  69 yo F hx of spinal stenosis and foraminal stenosis with severe radiculopathy scheduled for surgery 10/23 with Dr. Johnson but patient states pain is intolerable. Plan to admit to Dr. Johnson Neurousurgery and book for surgery in the coming days. She will need medical clearance prior to surgery.     #Severe lumbar stenosis with radicular pain   #Hyothyroid  #HTN  #DM type II  #Autoimmune Hepatitis     PLAN-  Surgery is schedule for Friday 9/29  Hospitalist consult for medical clearance   Continue pain control as needed   SQ heparin for DVT PPX till surgery   Incentive spirometer   Consistent carbohydrate diet and sliding scale insulin as needed   Continue home Blood pressure medications   Continue Synthroid     Discussed with Dr. Johnson who agrees with plan    C/c:  back pain with radiation to b/l LE    HPI: 68F, pmh of HTN, HLD, DMII, AI hepatitis, Hypothyroidism, Spinal stenosis who presented to the ER with intractable back pain/bilateral LE pain. She has knon spinal stenosis @L5-S1 with foraminal stenosis at L4-5. She was scheduled for surgery 10/23 with Dr. Harden, but bc of intractable pain came to the ER. She is admitted to neurosx. Hospitalist service consulted for medical clearance/comanagement.   pt seen and examined earlier today on 2N. [place] ok. c/o pain at iv site. No sob/chest pain. No recent stress test that she can recall. she has had spine surgery in [facility] in the past. No complications with anesthesia. Does not bleed easily.   No h/o MI/stroke.   Denies h/o heart disease.   Can climb a flight of stairs and walk a few blocks without sob/chest pains. No recent f/c/r/n/v/d.     ROS: all 10 systems reviewed and is as above otherwise negative.     PMH as above  PSH: spine surgery X 2 , , cataract  F/H: denies significant medical conditions in immediate family members  Social: no tobacco/etoh  Allergies: NKDA  Home meds: see med rec    Vital Signs Last 24 Hrs  T(C): 36.9 (26 Sep 2023 16:41), Max: 36.9 (26 Sep 2023 16:41)  T(F): 98.4 (26 Sep 2023 16:41), Max: 98.4 (26 Sep 2023 16:41)  HR: 65 (26 Sep 2023 16:41) (56 - 76)  BP: 142/69 (26 Sep 2023 16:41) (137/69 - 168/80)  RR: 16 (26 Sep 2023 16:41) (16 - 17)  SpO2: 97% (26 Sep 2023 16:41) (97% - 99%)    Parameters below as of 26 Sep 2023 16:41  Patient On (Oxygen Delivery Method): room air    PHYSICAL EXAM:    GENERAL: Comfortable, no acute distress   HEAD:  Normocephalic, atraumatic  EYES: EOMI, PERRLA  HEENT: Moist mucous membranes  NECK: Supple, No JVD  NERVOUS SYSTEM:  Alert & Oriented X3, Motor Strength 5/5 B/L upper and lower extremities  CHEST/LUNG: Clear to auscultation bilaterally  HEART: Regular rate and rhythm, +2/6 systolic murmur+ best heart left 2nd ics.  ABDOMEN: Soft, Nontender, Nondistended, Bowel sounds present  GENITOURINARY: Voiding, no palpable bladder  EXTREMITIES:   No clubbing, cyanosis, or edema  MUSCULOSKELTAL- No muscle tenderness, no joint tenderness  SKIN-no rash    LABS:                        12.9   5.58  )-----------( 337      ( 25 Sep 2023 17:49 )             37.2     -    136  |  103  |  10  ----------------------------<  135<H>  3.6   |  29  |  0.72    Ca    9.4      26 Sep 2023 07:17  Phos  3.2       Mg     1.9         TPro  7.9  /  Alb  4.2  /  TBili  0.6  /  DBili  x   /  AST  14<L>  /  ALT  21  /  AlkPhos  59  -26    PT/INR - ( 25 Sep 2023 17:49 )   PT: 10.8 sec;   INR: 0.95 ratio         PTT - ( 25 Sep 2023 17:49 )  PTT:27.8 sec  Urinalysis Basic - ( 26 Sep 2023 07:17 )    Color: x / Appearance: x / SG: x / pH: x  Gluc: 135 mg/dL / Ketone: x  / Bili: x / Urobili: x   Blood: x / Protein: x / Nitrite: x   Leuk Esterase: x / RBC: x / WBC x   Sq Epi: x / Non Sq Epi: x / Bacteria: x    MEDICATIONS  (STANDING):  dextrose 5%. 1000 milliLiter(s) (50 mL/Hr) IV Continuous <Continuous>  dextrose 5%. 1000 milliLiter(s) (100 mL/Hr) IV Continuous <Continuous>  dextrose 50% Injectable 25 Gram(s) IV Push once  dextrose 50% Injectable 12.5 Gram(s) IV Push once  dextrose 50% Injectable 25 Gram(s) IV Push once  enoxaparin Injectable 40 milliGRAM(s) SubCutaneous every 24 hours  glucagon  Injectable 1 milliGRAM(s) IntraMuscular once  insulin lispro (ADMELOG) corrective regimen sliding scale   SubCutaneous three times a day before meals    MEDICATIONS  (PRN):  acetaminophen     Tablet .. 650 milliGRAM(s) Oral every 6 hours PRN Mild Pain (1 - 3)  cyclobenzaprine 10 milliGRAM(s) Oral three times a day PRN Muscle Spasm  dextrose Oral Gel 15 Gram(s) Oral once PRN Blood Glucose LESS THAN 70 milliGRAM(s)/deciliter  morphine  - Injectable 2 milliGRAM(s) IV Push every 4 hours PRN Severe Pain (7 - 10)  ondansetron Injectable 4 milliGRAM(s) IV Push every 6 hours PRN Nausea and/or Vomiting  oxyCODONE    IR 5 milliGRAM(s) Oral every 4 hours PRN Moderate Pain (4 - 6)      Assessment and Plan:   68F, pmh as above a/w    1. Lumbar spinal stenosis with radicular pain:  -for OR friday.   -will check 2Decho prior to or given presence of systolic murmur.   -ekg reviewed, poor baseline/artifact, but with no acute ischemic changes.  -pain control   -incentive spirometry  -bowel regimen.     2. HTN:  -continue bb, arb    3. HLD:  -statin    4. DMII  -f/u [id number]   -hold metformin.   -continue ss for now.     5. Hypothyroid  -continue synthroid.     6. AI hepatitis:  -on budesonide 3mg daily.   -will need stress dose steroids periop.   -continue azathioprine.     7. DVT px:  -per primary team.       thanks, will follow.

## 2023-09-26 NOTE — PROGRESS NOTE ADULT - SUBJECTIVE AND OBJECTIVE BOX
HPI:  69 yo F hx of back pain, hypothyroidism, autoimmune hepatitis, HLD, DM Type II, HTN, degenerative disc disease and spinal stenosis is presenting with intolerable bilateral lower extremity pain and back pain x months. She was recently seen in the ED in September for similar symptoms but was discharged home with pain meds and instructed to follow up at her schedule appointment outpatient with Dr. Johnson. Her MRI at the time revealed multilevel degenerative disc disease, high grade stenosis at L5-S1 and marked foraminal stenosis at L4-L5. She is scheduled for surgery on 10/23 with Dr. Johnson but she states she cannot wait that long because the pain has become unbearable. She has been taking Gabapentin, Flexeril and Naproxen at home without relief. The pain is not better or worse with change in position. She states it is constant and wakes her up at night. She has no urinary retention or fecal incontinence.      9/26- Pt seen and examined on 2N, no events overnight, states she didn't sleep well, still c/o back pain and still c/o pain in her anterior thighs, surgery is scheduled for this coming Friday.     Vital Signs Last 24 Hrs  T(C): 36.6 (26 Sep 2023 08:04), Max: 37 (25 Sep 2023 16:55)  T(F): 97.9 (26 Sep 2023 08:04), Max: 98.6 (25 Sep 2023 16:55)  HR: 76 (26 Sep 2023 08:04) (56 - 76)  BP: 138/76 (26 Sep 2023 08:04) (133/65 - 180/73)  BP(mean): 82 (25 Sep 2023 16:55) (82 - 103)  RR: 16 (26 Sep 2023 08:04) (16 - 18)  SpO2: 99% (26 Sep 2023 08:04) (97% - 99%)    Parameters below as of 26 Sep 2023 08:04  Patient On (Oxygen Delivery Method): room air    MEDICATIONS  (STANDING):  enoxaparin Injectable 40 milliGRAM(s) SubCutaneous every 24 hours    MEDICATIONS  (PRN):  acetaminophen     Tablet .. 650 milliGRAM(s) Oral every 6 hours PRN Mild Pain (1 - 3)  cyclobenzaprine 10 milliGRAM(s) Oral three times a day PRN Muscle Spasm  morphine  - Injectable 2 milliGRAM(s) IV Push every 4 hours PRN Severe Pain (7 - 10)  ondansetron Injectable 4 milliGRAM(s) IV Push every 6 hours PRN Nausea and/or Vomiting  oxyCODONE    IR 5 milliGRAM(s) Oral every 4 hours PRN Moderate Pain (4 - 6)    ROS: pertinent positives in HPI, all other ROS were reviewed and are negative     PHYSICAL EXAM:  Constitutional: awake and alert  HEENT: PERRLA, EOMI, hearing intact   Neck: Supple.  Respiratory: Breath sounds are clear bilaterally  Cardiovascular: S1 and S2, regular rhythm  Gastrointestinal: soft, nontender, obese   Extremities:  no edema  Vascular: Carotid Bruit - no  Musculoskeletal: + straight leg raise b/l   Skin: No rashes  Psych: normal affect, participates in exam     Neurological exam:  HF: A x O x 3. Appropriately interactive, normal affect. Speech fluent in [place] Island[place], no Aphasia or paraphasic errors. Naming /repetition intact   CN: MARGARET, EOMI, VFF, facial sensation normal, no NLFD, tongue midline, Palate moves equally, SCM equal bilaterally  Motor: No pronator drift, Strength 5/5 in all 4 ext, normal bulk and tone, no tremor, rigidity or bradykinesia.    Sens: Intact to light touch  Reflexes: Symmetric and normal, downgoing toes b/l, no clonus   Coord:  No FNFA, dysmetria, KRISTI intact   Gait/Balance: Not tested       LABS:                         12.9   5.58  )-----------( 337      ( 25 Sep 2023 17:49 )             37.2     09-26    136  |  103  |  10  ----------------------------<  135<H>  3.6   |  29  |  0.72    Ca    9.4      26 Sep 2023 07:17  Phos  3.2     09-26  Mg     1.9     09-25    TPro  7.9  /  Alb  4.2  /  TBili  0.6  /  DBili  x   /  AST  14<L>  /  ALT  21  /  AlkPhos  59  09-26    LIVER FUNCTIONS - ( 26 Sep 2023 07:17 )  Alb: 4.2 g/dL / Pro: 7.9 gm/dL / ALK PHOS: 59 U/L / ALT: 21 U/L / AST: 14 U/L / GGT: x           RADIOLOGY:  outside imaging reviewed

## 2023-09-26 NOTE — PHARMACOTHERAPY INTERVENTION NOTE - COMMENTS
Medication history complete, patient/family unable to provide history, called Luis at [phone] to get medication list.

## 2023-09-27 LAB
A1C WITH ESTIMATED AVERAGE GLUCOSE RESULT: 6.1 % — HIGH (ref 4–5.6)
ESTIMATED AVERAGE GLUCOSE: 128 MG/DL — HIGH (ref 68–114)
GLUCOSE BLDC GLUCOMTR-MCNC: 149 MG/DL — HIGH (ref 70–99)
HCV AB S/CO SERPL IA: 0.17 S/CO — SIGNIFICANT CHANGE UP (ref 0–0.99)
HCV AB SERPL-IMP: SIGNIFICANT CHANGE UP

## 2023-09-27 PROCEDURE — 93010 ELECTROCARDIOGRAM REPORT: CPT

## 2023-09-27 PROCEDURE — 99231 SBSQ HOSP IP/OBS SF/LOW 25: CPT

## 2023-09-27 PROCEDURE — 93306 TTE W/DOPPLER COMPLETE: CPT | Mod: 26

## 2023-09-27 PROCEDURE — 99232 SBSQ HOSP IP/OBS MODERATE 35: CPT

## 2023-09-27 RX ORDER — GABAPENTIN 400 MG/1
600 CAPSULE ORAL THREE TIMES A DAY
Refills: 0 | Status: DISCONTINUED | OUTPATIENT
Start: 2023-09-27 | End: 2023-10-04

## 2023-09-27 RX ORDER — METOPROLOL TARTRATE 50 MG
25 TABLET ORAL DAILY
Refills: 0 | Status: DISCONTINUED | OUTPATIENT
Start: 2023-09-27 | End: 2023-10-04

## 2023-09-27 RX ORDER — LOSARTAN POTASSIUM 100 MG/1
100 TABLET, FILM COATED ORAL DAILY
Refills: 0 | Status: DISCONTINUED | OUTPATIENT
Start: 2023-09-27 | End: 2023-10-04

## 2023-09-27 RX ORDER — METOPROLOL TARTRATE 50 MG
25 TABLET ORAL ONCE
Refills: 0 | Status: COMPLETED | OUTPATIENT
Start: 2023-09-27 | End: 2023-09-27

## 2023-09-27 RX ORDER — LEVOTHYROXINE SODIUM 125 MCG
88 TABLET ORAL DAILY
Refills: 0 | Status: DISCONTINUED | OUTPATIENT
Start: 2023-09-27 | End: 2023-10-04

## 2023-09-27 RX ADMIN — Medication 650 MILLIGRAM(S): at 22:08

## 2023-09-27 RX ADMIN — ENOXAPARIN SODIUM 40 MILLIGRAM(S): 100 INJECTION SUBCUTANEOUS at 10:53

## 2023-09-27 RX ADMIN — Medication 25 MILLIGRAM(S): at 18:46

## 2023-09-27 RX ADMIN — OXYCODONE HYDROCHLORIDE 5 MILLIGRAM(S): 5 TABLET ORAL at 22:08

## 2023-09-27 RX ADMIN — Medication 650 MILLIGRAM(S): at 21:38

## 2023-09-27 RX ADMIN — GABAPENTIN 600 MILLIGRAM(S): 400 CAPSULE ORAL at 21:33

## 2023-09-27 RX ADMIN — OXYCODONE HYDROCHLORIDE 5 MILLIGRAM(S): 5 TABLET ORAL at 21:38

## 2023-09-27 NOTE — PROGRESS NOTE ADULT - SUBJECTIVE AND OBJECTIVE BOX
HPI:  HPI:  67 yo F hx of back pain, hypothyroidism, autoimmune hepatitis, HLD, DM Type II, HTN, degenerative disc disease and spinal stenosis is presenting with intolerable bilateral lower extremity pain and back pain x months. She was recently seen in the ED in September for similar symptoms but was discharged home with pain meds and instructed to follow up at her schedule appointment outpatient with Dr. Johnson. Her MRI at the time revealed multilevel degenerative disc disease, high grade stenosis at L5-S1 and marked foraminal stenosis at L4-L5. She is scheduled for surgery on 10/23 with Dr. Johnson but she states she cannot wait that long because the pain has become unbearable. She has been taking Gabapentin, Flexeril and Naproxen at home without relief. The pain is not better or worse with change in position. She states it is constant and wakes her up at night. She has no urinary retention or fecal incontinence.      9/26- Pt seen and examined on 2N, no events overnight, states she didn't sleep well, still c/o back pain and still c/o pain in her anterior thighs, surgery is scheduled for this coming Friday.     9/27- Pt seen and examined, no events overnight continued back pain, worse at night, hospitalist following for medical clearance, surgery scheduled for this Friday, will need Pre-op labs on Thursday     Vital Signs Last 24 Hrs  T(C): 36.7 (27 Sep 2023 08:30), Max: 36.9 (26 Sep 2023 16:41)  T(F): 98.1 (27 Sep 2023 08:30), Max: 98.4 (26 Sep 2023 16:41)  HR: 93 (27 Sep 2023 08:30) (65 - 93)  BP: 144/81 (27 Sep 2023 08:30) (136/74 - 144/81)  RR: 16 (27 Sep 2023 08:30) (16 - 16)  SpO2: 95% (27 Sep 2023 08:30) (95% - 99%)    Parameters below as of 27 Sep 2023 08:30  Patient On (Oxygen Delivery Method): room air    MEDICATIONS  (STANDING):  enoxaparin Injectable 40 milliGRAM(s) SubCutaneous every 24 hours  glucagon  Injectable 1 milliGRAM(s) IntraMuscular once  insulin lispro (ADMELOG) corrective regimen sliding scale   SubCutaneous three times a day before meals    MEDICATIONS  (PRN):  acetaminophen     Tablet .. 650 milliGRAM(s) Oral every 6 hours PRN Mild Pain (1 - 3)  cyclobenzaprine 10 milliGRAM(s) Oral three times a day PRN Muscle Spasm  dextrose Oral Gel 15 Gram(s) Oral once PRN Blood Glucose LESS THAN 70 milliGRAM(s)/deciliter  morphine  - Injectable 2 milliGRAM(s) IV Push every 4 hours PRN Severe Pain (7 - 10)  ondansetron Injectable 4 milliGRAM(s) IV Push every 6 hours PRN Nausea and/or Vomiting  oxyCODONE    IR 5 milliGRAM(s) Oral every 4 hours PRN Moderate Pain (4 - 6)    ROS: pertinent positives in HPI, all other ROS were reviewed and are negative     PHYSICAL EXAM:  Constitutional: awake and alert, sitting up in a chair   HEENT: PERRLA, EOMI, hearing intact   Neck: Supple  Respiratory: Breath sounds are clear bilaterally  Cardiovascular: S1 and S2, regular rhythm  Gastrointestinal: soft, nontender, obese   Extremities:  no edema  Vascular: Carotid Bruit - no  Musculoskeletal: + straight leg raise b/l   Skin: No rashes  Psych: normal affect, participates in exam     Neurological exam:  HF: A x O x 3. Appropriately interactive, normal affect. Speech fluent in Mauritian, no Aphasia or paraphasic errors. Naming /repetition intact   CN: MARGARET, EOMI, VFF, facial sensation normal, no NLFD, tongue midline, Palate moves equally, SCM equal bilaterally  Motor: No pronator drift, Strength 5/5 in all 4 ext, normal bulk and tone, no tremor, rigidity or bradykinesia.    Sens: Intact to light touch  Reflexes: Symmetric and normal, downgoing toes b/l, no clonus   Coord:  No FNFA, dysmetria, KRISTI intact   Gait/Balance: Not tested     LABS:                         12.9   5.58  )-----------( 337      ( 25 Sep 2023 17:49 )             37.2     09-26    136  |  103  |  10  ----------------------------<  135<H>  3.6   |  29  |  0.72    Ca    9.4      26 Sep 2023 07:17  Phos  3.2     09-26  Mg     1.9     09-25    TPro  7.9  /  Alb  4.2  /  TBili  0.6  /  DBili  x   /  AST  14<L>  /  ALT  21  /  AlkPhos  59  09-26    LIVER FUNCTIONS - ( 26 Sep 2023 07:17 )  Alb: 4.2 g/dL / Pro: 7.9 gm/dL / ALK PHOS: 59 U/L / ALT: 21 U/L / AST: 14 U/L / GGT: x                 RADIOLOGY:

## 2023-09-27 NOTE — PROGRESS NOTE ADULT - SUBJECTIVE AND OBJECTIVE BOX
C/c:  back pain with radiation to b/l LE    HPI: 68F, pmh of HTN, HLD, DMII, AI hepatitis, Hypothyroidism, Spinal stenosis who presented to the ER with intractable back pain/bilateral LE pain. She has knon spinal stenosis @L5-S1 with foraminal stenosis at L4-5. She was scheduled for surgery 10/23 with Dr. Johnson, but bc of intractable pain came to the ER. She is admitted to neurosx. Hospitalist service consulted for medical clearance/comanagement.    No sob/chest pain. No recent stress test that she can recall. she has had spine surgery in [facility] in the past. No complications with anesthesia. Does not bleed easily.   No h/o MI/stroke.   Denies h/o heart disease.   Can climb a flight of stairs and walk a few blocks without sob/chest pains. No recent f/c/r/n/v/d.       pt seen and examined this am. Felt ok. Pain controlled at rest. no sob/chest pain. tolerating po.     ROS: all 10 systems reviewed and is as above otherwise negative.     Vital Signs Last 24 Hrs  T(C): 36.7 (27 Sep 2023 08:30), Max: 36.9 (26 Sep 2023 16:41)  T(F): 98.1 (27 Sep 2023 08:30), Max: 98.4 (26 Sep 2023 16:41)  HR: 93 (27 Sep 2023 08:30) (65 - 93)  BP: 144/81 (27 Sep 2023 08:30) (136/74 - 144/81)  RR: 16 (27 Sep 2023 08:30) (16 - 16)  SpO2: 95% (27 Sep 2023 08:30) (95% - 99%)    Parameters below as of 27 Sep 2023 08:30  Patient On (Oxygen Delivery Method): room air        PHYSICAL EXAM:    GENERAL: Comfortable, no acute distress   HEAD:  Normocephalic, atraumatic  EYES: EOMI, PERRLA  HEENT: Moist mucous membranes  NECK: Supple, No JVD  NERVOUS SYSTEM:  Alert & Oriented X3, Motor Strength 5/5 B/L upper and lower extremities  CHEST/LUNG: Clear to auscultation bilaterally  HEART: Regular rate and rhythm, +2/6 systolic murmur  ABDOMEN: Soft, Nontender, Nondistended, Bowel sounds present  GENITOURINARY: Voiding, no palpable bladder  EXTREMITIES:   No clubbing, cyanosis, or edema  MUSCULOSKELETAL- No muscle tenderness, no joint tenderness  SKIN-no rash    LABS:                        12.9   5.58  )-----------( 337      ( 25 Sep 2023 17:49 )             37.2     09-26    136  |  103  |  10  ----------------------------<  135<H>  3.6   |  29  |  0.72    Ca    9.4      26 Sep 2023 07:17  Phos  3.2     09-26  Mg     1.9     09-25    TPro  7.9  /  Alb  4.2  /  TBili  0.6  /  DBili  x   /  AST  14<L>  /  ALT  21  /  AlkPhos  59  09-26    PT/INR - ( 25 Sep 2023 17:49 )   PT: 10.8 sec;   INR: 0.95 ratio      MEDICATIONS  (STANDING):  dextrose 5%. 1000 milliLiter(s) (100 mL/Hr) IV Continuous <Continuous>  dextrose 5%. 1000 milliLiter(s) (50 mL/Hr) IV Continuous <Continuous>  dextrose 50% Injectable 12.5 Gram(s) IV Push once  dextrose 50% Injectable 25 Gram(s) IV Push once  dextrose 50% Injectable 25 Gram(s) IV Push once  enoxaparin Injectable 40 milliGRAM(s) SubCutaneous every 24 hours  glucagon  Injectable 1 milliGRAM(s) IntraMuscular once  insulin lispro (ADMELOG) corrective regimen sliding scale   SubCutaneous three times a day before meals    MEDICATIONS  (PRN):  acetaminophen     Tablet .. 650 milliGRAM(s) Oral every 6 hours PRN Mild Pain (1 - 3)  cyclobenzaprine 10 milliGRAM(s) Oral three times a day PRN Muscle Spasm  dextrose Oral Gel 15 Gram(s) Oral once PRN Blood Glucose LESS THAN 70 milliGRAM(s)/deciliter  morphine  - Injectable 2 milliGRAM(s) IV Push every 4 hours PRN Severe Pain (7 - 10)  ondansetron Injectable 4 milliGRAM(s) IV Push every 6 hours PRN Nausea and/or Vomiting  oxyCODONE    IR 5 milliGRAM(s) Oral every 4 hours PRN Moderate Pain (4 - 6)        Assessment and Plan:   68F, pmh as above a/w    1. Lumbar spinal stenosis with radicular pain:  -for OR friday.   -2Decho reviewed, mild mr, aortic sclerosis, preserved lvef.  -ekg reviewed, poor baseline/artifact, but with no acute ischemic changes.  -no medical contraindications for OR at this time   -RCRI Class I risk  -pain control   -incentive spirometry  -bowel regimen.     2. HTN:  -continue bb, arb    3. HLD:  -statin    4. DMII  -f/u hgba1c   -hold metformin.   -continue ss for now.     5. Hypothyroid  -continue synthroid.     6. AI hepatitis:  -on budesonide 3mg daily (equivalent to 10mg prednisone daily)  -will need stress dose steroids periop.   -continue azathioprine.     7. DVT px:  -per primary team.       dispo: or friday C/c:  back pain with radiation to b/l LE    HPI: 68F, pmh of HTN, HLD, DMII, AI hepatitis, Hypothyroidism, Spinal stenosis who presented to the ER with intractable back pain/bilateral LE pain. She has knon spinal stenosis @L5-S1 with foraminal stenosis at L4-5. She was scheduled for surgery 10/23 with Dr. Johnson, but bc of intractable pain came to the ER. She is admitted to neurosx. Hospitalist service consulted for medical clearance/comanagement.    No sob/chest pain. No recent stress test that she can recall. she has had spine surgery in [facility] in the past. No complications with anesthesia. Does not bleed easily.   No h/o MI/stroke.   Denies h/o heart disease.   Can climb a flight of stairs and walk a few blocks without sob/chest pains. No recent f/c/r/n/v/d.       pt seen and examined this am. Felt ok. Pain controlled at rest. no sob/chest pain. tolerating po.     ROS: all 10 systems reviewed and is as above otherwise negative.     Vital Signs Last 24 Hrs  T(C): 36.7 (27 Sep 2023 08:30), Max: 36.9 (26 Sep 2023 16:41)  T(F): 98.1 (27 Sep 2023 08:30), Max: 98.4 (26 Sep 2023 16:41)  HR: 93 (27 Sep 2023 08:30) (65 - 93)  BP: 144/81 (27 Sep 2023 08:30) (136/74 - 144/81)  RR: 16 (27 Sep 2023 08:30) (16 - 16)  SpO2: 95% (27 Sep 2023 08:30) (95% - 99%)    Parameters below as of 27 Sep 2023 08:30  Patient On (Oxygen Delivery Method): room air        PHYSICAL EXAM:    GENERAL: Comfortable, no acute distress   HEAD:  Normocephalic, atraumatic  EYES: EOMI, PERRLA  HEENT: Moist mucous membranes  NECK: Supple, No JVD  NERVOUS SYSTEM:  Alert & Oriented X3, Motor Strength 5/5 B/L upper and lower extremities  CHEST/LUNG: Clear to auscultation bilaterally  HEART: Regular rate and rhythm, +2/6 systolic murmur  ABDOMEN: Soft, Nontender, Nondistended, Bowel sounds present  GENITOURINARY: Voiding, no palpable bladder  EXTREMITIES:   No clubbing, cyanosis, or edema  MUSCULOSKELETAL- No muscle tenderness, no joint tenderness  SKIN-no rash    LABS:                        12.9   5.58  )-----------( 337      ( 25 Sep 2023 17:49 )             37.2     09-26    136  |  103  |  10  ----------------------------<  135<H>  3.6   |  29  |  0.72    Ca    9.4      26 Sep 2023 07:17  Phos  3.2     09-26  Mg     1.9     09-25    TPro  7.9  /  Alb  4.2  /  TBili  0.6  /  DBili  x   /  AST  14<L>  /  ALT  21  /  AlkPhos  59  09-26    PT/INR - ( 25 Sep 2023 17:49 )   PT: 10.8 sec;   INR: 0.95 ratio      MEDICATIONS  (STANDING):  dextrose 5%. 1000 milliLiter(s) (100 mL/Hr) IV Continuous <Continuous>  dextrose 5%. 1000 milliLiter(s) (50 mL/Hr) IV Continuous <Continuous>  dextrose 50% Injectable 12.5 Gram(s) IV Push once  dextrose 50% Injectable 25 Gram(s) IV Push once  dextrose 50% Injectable 25 Gram(s) IV Push once  enoxaparin Injectable 40 milliGRAM(s) SubCutaneous every 24 hours  glucagon  Injectable 1 milliGRAM(s) IntraMuscular once  insulin lispro (ADMELOG) corrective regimen sliding scale   SubCutaneous three times a day before meals    MEDICATIONS  (PRN):  acetaminophen     Tablet .. 650 milliGRAM(s) Oral every 6 hours PRN Mild Pain (1 - 3)  cyclobenzaprine 10 milliGRAM(s) Oral three times a day PRN Muscle Spasm  dextrose Oral Gel 15 Gram(s) Oral once PRN Blood Glucose LESS THAN 70 milliGRAM(s)/deciliter  morphine  - Injectable 2 milliGRAM(s) IV Push every 4 hours PRN Severe Pain (7 - 10)  ondansetron Injectable 4 milliGRAM(s) IV Push every 6 hours PRN Nausea and/or Vomiting  oxyCODONE    IR 5 milliGRAM(s) Oral every 4 hours PRN Moderate Pain (4 - 6)        Assessment and Plan:   68F, pmh as above a/w    1. Lumbar spinal stenosis with radicular pain:  -for OR friday.   -2Decho reviewed, mild mr, aortic sclerosis, preserved lvef.  -ekg reviewed, poor baseline/artifact, but with no acute ischemic changes.  -no medical contraindications for OR at this time   -RCRI Class I risk  -pain control   -incentive spirometry  -bowel regimen.     2. HTN:  -continue bb, arb    3. HLD:  -statin    4. DMII  -hgba1c 6.1-->c/w prediabetes.  -hold metformin.   -dc ss    5. Hypothyroid  -continue synthroid.     6. AI hepatitis:  -on budesonide 3mg daily (equivalent to 10mg prednisone daily)  -will need stress dose steroids periop.   -continue azathioprine.     7. DVT px:  -per primary team.       dispo: or friday

## 2023-09-27 NOTE — PROGRESS NOTE ADULT - ASSESSMENT
69 yo F hx of spinal stenosis and foraminal stenosis with severe radiculopathy scheduled for surgery 10/23 with Dr. Johnson but patient states pain is intolerable. Plan to admit to Dr. Johnson Neurousurgery and book for surgery in the coming days. She will need medical clearance prior to surgery.     #Severe lumbar stenosis with radicular pain   #Hyothyroid  #HTN  #DM type II  #Autoimmune Hepatitis     PLAN-  Surgery is schedule for Friday 9/29  Hospitalist following for medical clearance   Continue pain control as needed   SQ heparin for DVT PPX till surgery - D/C on Thursday   Incentive spirometer   Consistent carbohydrate diet and sliding scale insulin as needed   Continue home Blood pressure medications   Continue Synthroid     Discussed with Dr. Johnson who agrees with plan

## 2023-09-28 LAB — BLD GP AB SCN SERPL QL: SIGNIFICANT CHANGE UP

## 2023-09-28 PROCEDURE — 99233 SBSQ HOSP IP/OBS HIGH 50: CPT | Mod: 57

## 2023-09-28 PROCEDURE — 99231 SBSQ HOSP IP/OBS SF/LOW 25: CPT

## 2023-09-28 PROCEDURE — 99232 SBSQ HOSP IP/OBS MODERATE 35: CPT

## 2023-09-28 RX ORDER — HYDROCORTISONE 20 MG
100 TABLET ORAL EVERY 8 HOURS
Refills: 0 | Status: COMPLETED | OUTPATIENT
Start: 2023-09-29 | End: 2023-09-30

## 2023-09-28 RX ORDER — TRANEXAMIC ACID 100 MG/ML
1000 INJECTION, SOLUTION INTRAVENOUS ONCE
Refills: 0 | Status: DISCONTINUED | OUTPATIENT
Start: 2023-09-29 | End: 2023-09-29

## 2023-09-28 RX ORDER — TRANEXAMIC ACID 100 MG/ML
1 INJECTION, SOLUTION INTRAVENOUS
Qty: 1000 | Refills: 0 | Status: DISCONTINUED | OUTPATIENT
Start: 2023-09-29 | End: 2023-09-29

## 2023-09-28 RX ADMIN — Medication 88 MICROGRAM(S): at 05:26

## 2023-09-28 RX ADMIN — Medication 650 MILLIGRAM(S): at 09:58

## 2023-09-28 RX ADMIN — Medication 650 MILLIGRAM(S): at 10:58

## 2023-09-28 RX ADMIN — GABAPENTIN 600 MILLIGRAM(S): 400 CAPSULE ORAL at 05:26

## 2023-09-28 RX ADMIN — GABAPENTIN 600 MILLIGRAM(S): 400 CAPSULE ORAL at 21:56

## 2023-09-28 RX ADMIN — CYCLOBENZAPRINE HYDROCHLORIDE 10 MILLIGRAM(S): 10 TABLET, FILM COATED ORAL at 21:56

## 2023-09-28 RX ADMIN — LOSARTAN POTASSIUM 100 MILLIGRAM(S): 100 TABLET, FILM COATED ORAL at 09:57

## 2023-09-28 RX ADMIN — Medication 25 MILLIGRAM(S): at 09:57

## 2023-09-28 RX ADMIN — GABAPENTIN 600 MILLIGRAM(S): 400 CAPSULE ORAL at 16:14

## 2023-09-28 RX ADMIN — ENOXAPARIN SODIUM 40 MILLIGRAM(S): 100 INJECTION SUBCUTANEOUS at 09:57

## 2023-09-28 NOTE — PROGRESS NOTE ADULT - SUBJECTIVE AND OBJECTIVE BOX
HPI:  69 yo F hx of back pain, hypothyroidism, autoimmune hepatitis, HLD, DM Type II, HTN, degenerative disc disease and spinal stenosis is presenting with intolerable bilateral lower extremity pain and back pain x months. She was recently seen in the ED in September for similar symptoms but was discharged home with pain meds and instructed to follow up at her schedule appointment outpatient with Dr. Johnson. Her MRI at the time revealed multilevel degenerative disc disease, high grade stenosis at L5-S1 and marked foraminal stenosis at L4-L5. She is scheduled for surgery on 10/23 with Dr. Johnson but she states she cannot wait that long because the pain has become unbearable. She has been taking Gabapentin, Flexeril and Naproxen at home without relief. The pain is not better or worse with change in position. She states it is constant and wakes her up at night. She has no urinary retention or fecal incontinence.      9/26- Pt seen and examined on 2N, no events overnight, states she didn't sleep well, still c/o back pain and still c/o pain in her anterior thighs, surgery is scheduled for this coming Friday.     9/27- Pt seen and examined, no events overnight continued back pain, worse at night, hospitalist following for medical clearance, surgery scheduled for this Friday, will need Pre-op labs on Thursday 9/28- Pt seen and examined, no events overnight, plan for OR tomorrow, medical clearance is on the chart     Vital Signs Last 24 Hrs  T(C): 36.6 (28 Sep 2023 08:34), Max: 37 (28 Sep 2023 06:00)  T(F): 97.9 (28 Sep 2023 08:34), Max: 98.6 (28 Sep 2023 06:00)  HR: 61 (28 Sep 2023 08:34) (56 - 96)  BP: 122/73 (28 Sep 2023 08:34) (122/73 - 157/97)  RR: 16 (28 Sep 2023 08:34) (16 - 16)  SpO2: 98% (28 Sep 2023 08:34) (96% - 100%)    Parameters below as of 28 Sep 2023 08:34  Patient On (Oxygen Delivery Method): room air    MEDICATIONS  (STANDING):  enoxaparin Injectable 40 milliGRAM(s) SubCutaneous every 24 hours  gabapentin 600 milliGRAM(s) Oral three times a day  levothyroxine 88 MICROGram(s) Oral daily  losartan 100 milliGRAM(s) Oral daily  metoprolol tartrate 25 milliGRAM(s) Oral daily    MEDICATIONS  (PRN):  acetaminophen     Tablet .. 650 milliGRAM(s) Oral every 6 hours PRN Mild Pain (1 - 3)  cyclobenzaprine 10 milliGRAM(s) Oral three times a day PRN Muscle Spasm  morphine  - Injectable 2 milliGRAM(s) IV Push every 4 hours PRN Severe Pain (7 - 10)  ondansetron Injectable 4 milliGRAM(s) IV Push every 6 hours PRN Nausea and/or Vomiting  oxyCODONE    IR 5 milliGRAM(s) Oral every 4 hours PRN Moderate Pain (4 - 6)    ROS: pertinent positives in HPI, all other ROS were reviewed and are negative     PHYSICAL EXAM:  Constitutional: awake and alert, sitting up in a chair   HEENT: PERRLA, EOMI, hearing intact   Neck: Supple  Respiratory: Breath sounds are clear bilaterally  Cardiovascular: S1 and S2, regular rhythm  Gastrointestinal: soft, nontender, obese   Extremities:  no edema  Vascular: Carotid Bruit - no  Musculoskeletal: + straight leg raise b/l   Skin: No rashes  Psych: normal affect, participates in exam     Neurological exam:  HF: A x O x 3. Appropriately interactive, normal affect. Speech fluent in Russian, no Aphasia or paraphasic errors. Naming /repetition intact   CN: MARGARET, EOMI, VFF, facial sensation normal, no NLFD, tongue midline, Palate moves equally, SCM equal bilaterally  Motor: No pronator drift, Strength 5/5 in all 4 ext, normal bulk and tone, no tremor, rigidity or bradykinesia.    Sens: Intact to light touch  Reflexes: Symmetric and normal, downgoing toes b/l, no clonus   Coord:  No FNFA, dysmetria, KRISTI intact   Gait/Balance: Not tested

## 2023-09-28 NOTE — PROGRESS NOTE ADULT - ASSESSMENT
67 yo F hx of spinal stenosis and foraminal stenosis with severe radiculopathy scheduled for surgery 10/23 with Dr. Johnson but patient states pain is intolerable. Plan to admit to Dr. Johnson Neurousurgery and book for surgery in the coming days. She will need medical clearance prior to surgery.     #Severe lumbar stenosis with radicular pain   #Hyothyroid  #HTN  #DM type II  #Autoimmune Hepatitis     PLAN-  Surgery is schedule for Friday 9/29  Hospitalist following, medical clearance on the chart  Continue pain control as needed   D/C Lovenox  order TXA and Type and screen   NPO after midnight   Incentive spirometer   Consistent carbohydrate diet and sliding scale insulin as needed   Continue home Blood pressure medications   Continue Synthroid     Discussed with Dr. Johnson who agrees with plan  67 yo F hx of spinal stenosis and foraminal stenosis with severe radiculopathy scheduled for surgery 10/23 with Dr. Johnson but patient states pain is intolerable. Plan to admit to Dr. Johnson Neurousurgery and book for surgery in the coming days. She will need medical clearance prior to surgery.     #Severe lumbar stenosis with radicular pain   #Hyothyroid  #HTN  #DM type II  #Autoimmune Hepatitis     PLAN-  Surgery is schedule for Friday 9/29-- third case, late afternoon   Hospitalist following, medical clearance on the chart  Will need stress dose of steroid prior to OR on Friday   Continue pain control as needed   D/C Lovenox  order TXA and Type and screen   NPO after midnight   Incentive spirometer   Consistent carbohydrate diet and sliding scale insulin as needed   Continue home Blood pressure medications   Continue Synthroid     Discussed with Dr. Johnson who agrees with plan

## 2023-09-28 NOTE — PROGRESS NOTE ADULT - SUBJECTIVE AND OBJECTIVE BOX
C/c:  back pain with radiation to b/l LE    HPI: 68F, pmh of HTN, HLD, DMII, AI hepatitis, Hypothyroidism, Spinal stenosis who presented to the ER with intractable back pain/bilateral LE pain. She has knon spinal stenosis @L5-S1 with foraminal stenosis at L4-5. She was scheduled for surgery 10/23 with Dr. Johnson, but bc of intractable pain came to the ER. She is admitted to neurosx. Hospitalist service consulted for medical clearance/comanagement.    No sob/chest pain. No recent stress test that she can recall. she has had spine surgery in [facility] in the past. No complications with anesthesia. Does not bleed easily.   No h/o MI/stroke.   Denies h/o heart disease.   Can climb a flight of stairs and walk a few blocks without sob/chest pains. No recent f/c/r/n/v/d.     9/28/23- up in a chair, comfortable.     ROS: all 10 systems reviewed and is as above otherwise negative.     Vital Signs Last 24 Hrs  T(C): 36.6 (28 Sep 2023 08:34), Max: 37 (28 Sep 2023 06:00)  T(F): 97.9 (28 Sep 2023 08:34), Max: 98.6 (28 Sep 2023 06:00)  HR: 61 (28 Sep 2023 08:34) (56 - 96)  BP: 122/73 (28 Sep 2023 08:34) (122/73 - 157/97)  BP(mean): --  RR: 16 (28 Sep 2023 08:34) (16 - 16)  SpO2: 98% (28 Sep 2023 08:34) (96% - 100%)    Parameters below as of 28 Sep 2023 08:34  Patient On (Oxygen Delivery Method): room air    PHYSICAL EXAM:  GENERAL: Comfortable, no acute distress   HEAD:  Normocephalic, atraumatic  EYES: EOMI, PERRLA  HEENT: Moist mucous membranes  NECK: Supple, No JVD  NERVOUS SYSTEM:  Alert & Oriented X3, Motor Strength 5/5 B/L upper and lower extremities  CHEST/LUNG: Clear to auscultation bilaterally  HEART: Regular rate and rhythm, +2/6 systolic murmur  ABDOMEN: Soft, Nontender, Nondistended, Bowel sounds present  GENITOURINARY: Voiding, no palpable bladder  EXTREMITIES:   No clubbing, cyanosis, or edema  MUSCULOSKELETAL- No muscle tenderness, no joint tenderness  SKIN-no rash    LABS:             NO recent labs    MEDICATIONS  (STANDING):  dextrose 5%. 1000 milliLiter(s) (100 mL/Hr) IV Continuous <Continuous>  dextrose 5%. 1000 milliLiter(s) (50 mL/Hr) IV Continuous <Continuous>  dextrose 50% Injectable 12.5 Gram(s) IV Push once  dextrose 50% Injectable 25 Gram(s) IV Push once  dextrose 50% Injectable 25 Gram(s) IV Push once  enoxaparin Injectable 40 milliGRAM(s) SubCutaneous every 24 hours  glucagon  Injectable 1 milliGRAM(s) IntraMuscular once  insulin lispro (ADMELOG) corrective regimen sliding scale   SubCutaneous three times a day before meals    MEDICATIONS  (PRN):  acetaminophen     Tablet .. 650 milliGRAM(s) Oral every 6 hours PRN Mild Pain (1 - 3)  cyclobenzaprine 10 milliGRAM(s) Oral three times a day PRN Muscle Spasm  dextrose Oral Gel 15 Gram(s) Oral once PRN Blood Glucose LESS THAN 70 milliGRAM(s)/deciliter  morphine  - Injectable 2 milliGRAM(s) IV Push every 4 hours PRN Severe Pain (7 - 10)  ondansetron Injectable 4 milliGRAM(s) IV Push every 6 hours PRN Nausea and/or Vomiting  oxyCODONE    IR 5 milliGRAM(s) Oral every 4 hours PRN Moderate Pain (4 - 6)      Assessment and Plan:   68F, pmh as above a/w    1. Lumbar spinal stenosis with radicular pain:  -for OR friday.   -2Decho reviewed, mild mr, aortic sclerosis, preserved lvef.  -ekg reviewed, poor baseline/artifact, but with no acute ischemic changes.  -no medical contraindications for OR at this time, pt is medically optimized  -RCRI Class I risk  -pain control   -incentive spirometry  -bowel regimen.     2. HTN:  -continue bb, arb    3. HLD:  -statin    4. DMII  -hgba1c 6.1-->c/w prediabetes.  -hold metformin.   -dc ss    5. Hypothyroid  -continue synthroid.     6. AI hepatitis:  -on budesonide 3mg daily (equivalent to 10mg prednisone daily)  -continue azathioprine.   - start Hydrocortisone stress dose tomorrow AM    7. DVT px:  -per primary team     #Dispo- OR tomorrow

## 2023-09-29 ENCOUNTER — TRANSCRIPTION ENCOUNTER (OUTPATIENT)
Age: 68
End: 2023-09-29

## 2023-09-29 ENCOUNTER — APPOINTMENT (OUTPATIENT)
Dept: NEUROSURGERY | Facility: HOSPITAL | Age: 68
End: 2023-09-29

## 2023-09-29 LAB
ANION GAP SERPL CALC-SCNC: 5 MMOL/L — SIGNIFICANT CHANGE UP (ref 5–17)
APPEARANCE UR: CLEAR — SIGNIFICANT CHANGE UP
APTT BLD: 27.7 SEC — SIGNIFICANT CHANGE UP (ref 24.5–35.6)
BILIRUB UR-MCNC: NEGATIVE — SIGNIFICANT CHANGE UP
BUN SERPL-MCNC: 12 MG/DL — SIGNIFICANT CHANGE UP (ref 7–23)
CALCIUM SERPL-MCNC: 9.1 MG/DL — SIGNIFICANT CHANGE UP (ref 8.5–10.1)
CHLORIDE SERPL-SCNC: 108 MMOL/L — SIGNIFICANT CHANGE UP (ref 96–108)
CO2 SERPL-SCNC: 27 MMOL/L — SIGNIFICANT CHANGE UP (ref 22–31)
COLOR SPEC: YELLOW — SIGNIFICANT CHANGE UP
CREAT SERPL-MCNC: 0.66 MG/DL — SIGNIFICANT CHANGE UP (ref 0.5–1.3)
DIFF PNL FLD: NEGATIVE — SIGNIFICANT CHANGE UP
EGFR: 95 ML/MIN/1.73M2 — SIGNIFICANT CHANGE UP
GLUCOSE BLDC GLUCOMTR-MCNC: 120 MG/DL — HIGH (ref 70–99)
GLUCOSE BLDC GLUCOMTR-MCNC: 123 MG/DL — HIGH (ref 70–99)
GLUCOSE BLDC GLUCOMTR-MCNC: 126 MG/DL — HIGH (ref 70–99)
GLUCOSE BLDC GLUCOMTR-MCNC: 143 MG/DL — HIGH (ref 70–99)
GLUCOSE SERPL-MCNC: 142 MG/DL — HIGH (ref 70–99)
GLUCOSE UR QL: NEGATIVE — SIGNIFICANT CHANGE UP
HCT VFR BLD CALC: 37.7 % — SIGNIFICANT CHANGE UP (ref 34.5–45)
HGB BLD-MCNC: 12.9 G/DL — SIGNIFICANT CHANGE UP (ref 11.5–15.5)
INR BLD: 0.97 RATIO — SIGNIFICANT CHANGE UP (ref 0.85–1.18)
KETONES UR-MCNC: NEGATIVE — SIGNIFICANT CHANGE UP
LEUKOCYTE ESTERASE UR-ACNC: NEGATIVE — SIGNIFICANT CHANGE UP
MCHC RBC-ENTMCNC: 30.2 PG — SIGNIFICANT CHANGE UP (ref 27–34)
MCHC RBC-ENTMCNC: 34.2 GM/DL — SIGNIFICANT CHANGE UP (ref 32–36)
MCV RBC AUTO: 88.3 FL — SIGNIFICANT CHANGE UP (ref 80–100)
NITRITE UR-MCNC: NEGATIVE — SIGNIFICANT CHANGE UP
PH UR: 6 — SIGNIFICANT CHANGE UP (ref 5–8)
PLATELET # BLD AUTO: 328 K/UL — SIGNIFICANT CHANGE UP (ref 150–400)
POTASSIUM SERPL-MCNC: 4.1 MMOL/L — SIGNIFICANT CHANGE UP (ref 3.5–5.3)
POTASSIUM SERPL-SCNC: 4.1 MMOL/L — SIGNIFICANT CHANGE UP (ref 3.5–5.3)
PROT UR-MCNC: NEGATIVE — SIGNIFICANT CHANGE UP
PROTHROM AB SERPL-ACNC: 11 SEC — SIGNIFICANT CHANGE UP (ref 9.5–13)
RBC # BLD: 4.27 M/UL — SIGNIFICANT CHANGE UP (ref 3.8–5.2)
RBC # FLD: 14.2 % — SIGNIFICANT CHANGE UP (ref 10.3–14.5)
SODIUM SERPL-SCNC: 140 MMOL/L — SIGNIFICANT CHANGE UP (ref 135–145)
SP GR SPEC: 1.01 — SIGNIFICANT CHANGE UP (ref 1.01–1.02)
UROBILINOGEN FLD QL: NEGATIVE — SIGNIFICANT CHANGE UP
WBC # BLD: 4.85 K/UL — SIGNIFICANT CHANGE UP (ref 3.8–10.5)
WBC # FLD AUTO: 4.85 K/UL — SIGNIFICANT CHANGE UP (ref 3.8–10.5)

## 2023-09-29 PROCEDURE — 22842 INSERT SPINE FIXATION DEVICE: CPT | Mod: AS

## 2023-09-29 PROCEDURE — 61783 SCAN PROC SPINAL: CPT | Mod: 59

## 2023-09-29 PROCEDURE — 63052 LAM FACETC/FRMT ARTHRD LUM 1: CPT | Mod: 59

## 2023-09-29 PROCEDURE — 22853 INSJ BIOMECHANICAL DEVICE: CPT | Mod: AS

## 2023-09-29 PROCEDURE — 22614 ARTHRD PST TQ 1NTRSPC EA ADD: CPT | Mod: AS

## 2023-09-29 PROCEDURE — 22630 ARTHRD PST TQ 1NTRSPC LUM: CPT | Mod: AS

## 2023-09-29 PROCEDURE — 22853 INSJ BIOMECHANICAL DEVICE: CPT

## 2023-09-29 PROCEDURE — 61783 SCAN PROC SPINAL: CPT | Mod: AS,59

## 2023-09-29 PROCEDURE — 63047 LAM FACETEC & FORAMOT LUMBAR: CPT | Mod: AS,59

## 2023-09-29 PROCEDURE — 63052 LAM FACETC/FRMT ARTHRD LUM 1: CPT | Mod: AS,59

## 2023-09-29 PROCEDURE — 22614 ARTHRD PST TQ 1NTRSPC EA ADD: CPT

## 2023-09-29 PROCEDURE — 63047 LAM FACETEC & FORAMOT LUMBAR: CPT | Mod: 59

## 2023-09-29 PROCEDURE — 99232 SBSQ HOSP IP/OBS MODERATE 35: CPT

## 2023-09-29 PROCEDURE — 22842 INSERT SPINE FIXATION DEVICE: CPT

## 2023-09-29 PROCEDURE — 22630 ARTHRD PST TQ 1NTRSPC LUM: CPT

## 2023-09-29 RX ORDER — NALOXONE HYDROCHLORIDE 4 MG/.1ML
0.1 SPRAY NASAL
Refills: 0 | Status: DISCONTINUED | OUTPATIENT
Start: 2023-09-29 | End: 2023-10-04

## 2023-09-29 RX ORDER — SODIUM CHLORIDE 9 MG/ML
1000 INJECTION, SOLUTION INTRAVENOUS
Refills: 0 | Status: DISCONTINUED | OUTPATIENT
Start: 2023-09-29 | End: 2023-09-29

## 2023-09-29 RX ORDER — HYDROMORPHONE HYDROCHLORIDE 2 MG/ML
1 INJECTION INTRAMUSCULAR; INTRAVENOUS; SUBCUTANEOUS
Refills: 0 | Status: DISCONTINUED | OUTPATIENT
Start: 2023-09-29 | End: 2023-09-29

## 2023-09-29 RX ORDER — ONDANSETRON 8 MG/1
4 TABLET, FILM COATED ORAL EVERY 6 HOURS
Refills: 0 | Status: DISCONTINUED | OUTPATIENT
Start: 2023-09-29 | End: 2023-10-04

## 2023-09-29 RX ORDER — HYDROMORPHONE HYDROCHLORIDE 2 MG/ML
0.5 INJECTION INTRAMUSCULAR; INTRAVENOUS; SUBCUTANEOUS
Refills: 0 | Status: DISCONTINUED | OUTPATIENT
Start: 2023-09-29 | End: 2023-10-01

## 2023-09-29 RX ORDER — ONDANSETRON 8 MG/1
4 TABLET, FILM COATED ORAL ONCE
Refills: 0 | Status: DISCONTINUED | OUTPATIENT
Start: 2023-09-29 | End: 2023-09-29

## 2023-09-29 RX ORDER — ACETAMINOPHEN 500 MG
1000 TABLET ORAL ONCE
Refills: 0 | Status: DISCONTINUED | OUTPATIENT
Start: 2023-09-29 | End: 2023-10-04

## 2023-09-29 RX ORDER — HYDROCORTISONE 20 MG
50 TABLET ORAL EVERY 8 HOURS
Refills: 0 | Status: DISCONTINUED | OUTPATIENT
Start: 2023-09-30 | End: 2023-09-30

## 2023-09-29 RX ORDER — DIAZEPAM 5 MG
2.5 TABLET ORAL ONCE
Refills: 0 | Status: DISCONTINUED | OUTPATIENT
Start: 2023-09-29 | End: 2023-09-29

## 2023-09-29 RX ORDER — ACETAMINOPHEN 500 MG
1000 TABLET ORAL ONCE
Refills: 0 | Status: COMPLETED | OUTPATIENT
Start: 2023-09-29 | End: 2023-09-30

## 2023-09-29 RX ORDER — HYDROMORPHONE HYDROCHLORIDE 2 MG/ML
30 INJECTION INTRAMUSCULAR; INTRAVENOUS; SUBCUTANEOUS
Refills: 0 | Status: DISCONTINUED | OUTPATIENT
Start: 2023-09-29 | End: 2023-10-01

## 2023-09-29 RX ADMIN — SODIUM CHLORIDE 75 MILLILITER(S): 9 INJECTION, SOLUTION INTRAVENOUS at 20:50

## 2023-09-29 RX ADMIN — Medication 100 MILLIGRAM(S): at 05:22

## 2023-09-29 RX ADMIN — Medication 100 MILLIGRAM(S): at 22:22

## 2023-09-29 RX ADMIN — OXYCODONE HYDROCHLORIDE 5 MILLIGRAM(S): 5 TABLET ORAL at 09:10

## 2023-09-29 RX ADMIN — LOSARTAN POTASSIUM 100 MILLIGRAM(S): 100 TABLET, FILM COATED ORAL at 09:10

## 2023-09-29 RX ADMIN — HYDROMORPHONE HYDROCHLORIDE 30 MILLILITER(S): 2 INJECTION INTRAMUSCULAR; INTRAVENOUS; SUBCUTANEOUS at 20:50

## 2023-09-29 RX ADMIN — Medication 2.5 MILLIGRAM(S): at 22:02

## 2023-09-29 RX ADMIN — GABAPENTIN 600 MILLIGRAM(S): 400 CAPSULE ORAL at 05:22

## 2023-09-29 RX ADMIN — HYDROMORPHONE HYDROCHLORIDE 0.5 MILLIGRAM(S): 2 INJECTION INTRAMUSCULAR; INTRAVENOUS; SUBCUTANEOUS at 21:15

## 2023-09-29 RX ADMIN — OXYCODONE HYDROCHLORIDE 5 MILLIGRAM(S): 5 TABLET ORAL at 09:40

## 2023-09-29 RX ADMIN — HYDROMORPHONE HYDROCHLORIDE 0.5 MILLIGRAM(S): 2 INJECTION INTRAMUSCULAR; INTRAVENOUS; SUBCUTANEOUS at 21:00

## 2023-09-29 RX ADMIN — Medication 25 MILLIGRAM(S): at 09:10

## 2023-09-29 RX ADMIN — Medication 88 MICROGRAM(S): at 05:22

## 2023-09-29 RX ADMIN — Medication 100 MILLIGRAM(S): at 14:10

## 2023-09-29 NOTE — PROGRESS NOTE ADULT - SUBJECTIVE AND OBJECTIVE BOX
C/c:  back pain with radiation to b/l LE    HPI: 68F, pmh of HTN, HLD, DMII, AI hepatitis, Hypothyroidism, Spinal stenosis who presented to the ER with intractable back pain/bilateral LE pain. She has knon spinal stenosis @L5-S1 with foraminal stenosis at L4-5. She was scheduled for surgery 10/23 with Dr. Johnson, but bc of intractable pain came to the ER. She is admitted to neurosx. Hospitalist service consulted for medical clearance/comanagement.    No sob/chest pain. No recent stress test that she can recall. she has had spine surgery in [facility] in the past. No complications with anesthesia. Does not bleed easily.   No h/o MI/stroke.   Denies h/o heart disease.   Can climb a flight of stairs and walk a few blocks without sob/chest pains. No recent f/c/r/n/v/d.     23- up in a chair, comfortable.   23- NPO for OR    ROS: all 10 systems reviewed and is as above otherwise negative.     Vital Signs Last 24 Hrs  T(C): 36.7 (29 Sep 2023 12:21), Max: 36.7 (29 Sep 2023 07:57)  T(F): 98.1 (29 Sep 2023 12:21), Max: 98.1 (29 Sep 2023 07:57)  HR: 59 (29 Sep 2023 12:21) (59 - 74)  BP: 130/71 (29 Sep 2023 12:21) (119/75 - 130/71)  BP(mean): --  RR: 17 (29 Sep 2023 12:21) (17 - 17)  SpO2: 96% (29 Sep 2023 12:21) (96% - 100%)    Parameters below as of 29 Sep 2023 12:21  Patient On (Oxygen Delivery Method): room air    PHYSICAL EXAM:  GENERAL: Comfortable, no acute distress   HEAD:  Normocephalic, atraumatic  EYES: EOMI, PERRLA  HEENT: Moist mucous membranes  NECK: Supple, No JVD  NERVOUS SYSTEM:  Alert & Oriented X3, Motor Strength 5/5 B/L upper and lower extremities  CHEST/LUNG: Clear to auscultation bilaterally  HEART: Regular rate and rhythm, +2/6 systolic murmur  ABDOMEN: Soft, Nontender, Nondistended, Bowel sounds present  GENITOURINARY: Voiding, no palpable bladder  EXTREMITIES:   No clubbing, cyanosis, or edema  MUSCULOSKELETAL- No muscle tenderness, no joint tenderness  SKIN-no rash    LABS:                                   12.9   4.85  )-----------( 328      ( 29 Sep 2023 08:13 )             37.7     29 Sep 2023 08:13    140    |  108    |  12     ----------------------------<  142    4.1     |  27     |  0.66     Ca    9.1        29 Sep 2023 08:13    PT/INR - ( 29 Sep 2023 08:13 )   PT: 11.0 sec;   INR: 0.97 ratio     PTT - ( 29 Sep 2023 08:13 )  PTT:27.7 sec  CAPILLARY BLOOD GLUCOSE    POCT Blood Glucose.: 123 mg/dL (29 Sep 2023 12:01)  POCT Blood Glucose.: 126 mg/dL (29 Sep 2023 08:21)    Urinalysis Basic - ( 29 Sep 2023 09:20   Color: Yellow / Appearance: Clear / S.010 / pH: x  Gluc: x / Ketone: Negative  / Bili: Negative / Urobili: Negative   Blood: x / Protein: Negative / Nitrite: Negative   Leuk Esterase: Negative / RBC: x / WBC x   Sq Epi: x / Non Sq Epi: x / Bacteria: x    MEDICATIONS  (STANDING):  dextrose 5%. 1000 milliLiter(s) (100 mL/Hr) IV Continuous <Continuous>  dextrose 5%. 1000 milliLiter(s) (50 mL/Hr) IV Continuous <Continuous>  dextrose 50% Injectable 12.5 Gram(s) IV Push once  dextrose 50% Injectable 25 Gram(s) IV Push once  dextrose 50% Injectable 25 Gram(s) IV Push once  enoxaparin Injectable 40 milliGRAM(s) SubCutaneous every 24 hours  glucagon  Injectable 1 milliGRAM(s) IntraMuscular once  insulin lispro (ADMELOG) corrective regimen sliding scale   SubCutaneous three times a day before meals    MEDICATIONS  (PRN):  acetaminophen     Tablet .. 650 milliGRAM(s) Oral every 6 hours PRN Mild Pain (1 - 3)  cyclobenzaprine 10 milliGRAM(s) Oral three times a day PRN Muscle Spasm  dextrose Oral Gel 15 Gram(s) Oral once PRN Blood Glucose LESS THAN 70 milliGRAM(s)/deciliter  morphine  - Injectable 2 milliGRAM(s) IV Push every 4 hours PRN Severe Pain (7 - 10)  ondansetron Injectable 4 milliGRAM(s) IV Push every 6 hours PRN Nausea and/or Vomiting  oxyCODONE    IR 5 milliGRAM(s) Oral every 4 hours PRN Moderate Pain (4 - 6)      Assessment and Plan:   68F, pmh as above a/w    1. Lumbar spinal stenosis with radicular pain:  -for OR today  -2Decho reviewed, mild mr, aortic sclerosis, preserved lvef.  -ekg reviewed, poor baseline/artifact, but with no acute ischemic changes.  -no medical contraindications for OR at this time, pt is medically optimized  -RCRI Class I risk  -pain control   -incentive spirometry  -bowel regimen.     2. HTN:  -continue bb, arb    3. HLD:  -statin    4. DMII  -hgba1c 6.1-->c/w prediabetes.  -hold metformin.   -dc ss    5. Hypothyroid  -continue synthroid.     6. AI hepatitis:  -on budesonide 3mg daily (equivalent to 10mg prednisone daily)  -continue azathioprine.   -started on stress dose of hydrocortisone today, will taper down to 50mg q8h tomorrow    7. DVT px:  -per primary team     #Dispo- OR today

## 2023-09-29 NOTE — BRIEF OPERATIVE NOTE - NSICDXBRIEFPROCEDURE_GEN_ALL_CORE_FT
PROCEDURES:  Laminectomy, spine, 3 levels, with decompression and fusion 29-Sep-2023 22:23:54  Génesis Ruby

## 2023-09-30 LAB
ANION GAP SERPL CALC-SCNC: 4 MMOL/L — LOW (ref 5–17)
BUN SERPL-MCNC: 9 MG/DL — SIGNIFICANT CHANGE UP (ref 7–23)
CALCIUM SERPL-MCNC: 8.8 MG/DL — SIGNIFICANT CHANGE UP (ref 8.5–10.1)
CHLORIDE SERPL-SCNC: 106 MMOL/L — SIGNIFICANT CHANGE UP (ref 96–108)
CO2 SERPL-SCNC: 28 MMOL/L — SIGNIFICANT CHANGE UP (ref 22–31)
CREAT SERPL-MCNC: 0.54 MG/DL — SIGNIFICANT CHANGE UP (ref 0.5–1.3)
EGFR: 100 ML/MIN/1.73M2 — SIGNIFICANT CHANGE UP
GLUCOSE BLDC GLUCOMTR-MCNC: 128 MG/DL — HIGH (ref 70–99)
GLUCOSE BLDC GLUCOMTR-MCNC: 166 MG/DL — HIGH (ref 70–99)
GLUCOSE SERPL-MCNC: 142 MG/DL — HIGH (ref 70–99)
HCT VFR BLD CALC: 35 % — SIGNIFICANT CHANGE UP (ref 34.5–45)
HGB BLD-MCNC: 12 G/DL — SIGNIFICANT CHANGE UP (ref 11.5–15.5)
MCHC RBC-ENTMCNC: 30.7 PG — SIGNIFICANT CHANGE UP (ref 27–34)
MCHC RBC-ENTMCNC: 34.3 GM/DL — SIGNIFICANT CHANGE UP (ref 32–36)
MCV RBC AUTO: 89.5 FL — SIGNIFICANT CHANGE UP (ref 80–100)
PLATELET # BLD AUTO: 283 K/UL — SIGNIFICANT CHANGE UP (ref 150–400)
POTASSIUM SERPL-MCNC: 3.8 MMOL/L — SIGNIFICANT CHANGE UP (ref 3.5–5.3)
POTASSIUM SERPL-SCNC: 3.8 MMOL/L — SIGNIFICANT CHANGE UP (ref 3.5–5.3)
RBC # BLD: 3.91 M/UL — SIGNIFICANT CHANGE UP (ref 3.8–5.2)
RBC # FLD: 14.4 % — SIGNIFICANT CHANGE UP (ref 10.3–14.5)
SODIUM SERPL-SCNC: 138 MMOL/L — SIGNIFICANT CHANGE UP (ref 135–145)
WBC # BLD: 11.28 K/UL — HIGH (ref 3.8–10.5)
WBC # FLD AUTO: 11.28 K/UL — HIGH (ref 3.8–10.5)

## 2023-09-30 PROCEDURE — 99232 SBSQ HOSP IP/OBS MODERATE 35: CPT

## 2023-09-30 RX ORDER — SENNA PLUS 8.6 MG/1
2 TABLET ORAL AT BEDTIME
Refills: 0 | Status: DISCONTINUED | OUTPATIENT
Start: 2023-09-30 | End: 2023-10-04

## 2023-09-30 RX ORDER — HYDROCORTISONE 20 MG
25 TABLET ORAL EVERY 8 HOURS
Refills: 0 | Status: COMPLETED | OUTPATIENT
Start: 2023-10-01 | End: 2023-10-01

## 2023-09-30 RX ORDER — HYDROCORTISONE 20 MG
50 TABLET ORAL ONCE
Refills: 0 | Status: COMPLETED | OUTPATIENT
Start: 2023-09-30 | End: 2023-09-30

## 2023-09-30 RX ORDER — POLYETHYLENE GLYCOL 3350 17 G/17G
17 POWDER, FOR SOLUTION ORAL DAILY
Refills: 0 | Status: DISCONTINUED | OUTPATIENT
Start: 2023-09-30 | End: 2023-10-01

## 2023-09-30 RX ORDER — PANTOPRAZOLE SODIUM 20 MG/1
40 TABLET, DELAYED RELEASE ORAL
Refills: 0 | Status: DISCONTINUED | OUTPATIENT
Start: 2023-09-30 | End: 2023-10-04

## 2023-09-30 RX ORDER — BUDESONIDE, MICRONIZED 100 %
3 POWDER (GRAM) MISCELLANEOUS DAILY
Refills: 0 | Status: DISCONTINUED | OUTPATIENT
Start: 2023-10-02 | End: 2023-10-04

## 2023-09-30 RX ADMIN — Medication 50 MILLIGRAM(S): at 13:19

## 2023-09-30 RX ADMIN — Medication 100 MILLIGRAM(S): at 05:15

## 2023-09-30 RX ADMIN — LOSARTAN POTASSIUM 100 MILLIGRAM(S): 100 TABLET, FILM COATED ORAL at 10:03

## 2023-09-30 RX ADMIN — GABAPENTIN 600 MILLIGRAM(S): 400 CAPSULE ORAL at 13:19

## 2023-09-30 RX ADMIN — POLYETHYLENE GLYCOL 3350 17 GRAM(S): 17 POWDER, FOR SOLUTION ORAL at 22:10

## 2023-09-30 RX ADMIN — GABAPENTIN 600 MILLIGRAM(S): 400 CAPSULE ORAL at 05:15

## 2023-09-30 RX ADMIN — Medication 25 MILLIGRAM(S): at 10:02

## 2023-09-30 RX ADMIN — Medication 400 MILLIGRAM(S): at 02:22

## 2023-09-30 RX ADMIN — Medication 1000 MILLIGRAM(S): at 02:52

## 2023-09-30 RX ADMIN — GABAPENTIN 600 MILLIGRAM(S): 400 CAPSULE ORAL at 22:07

## 2023-09-30 RX ADMIN — Medication 88 MICROGRAM(S): at 05:15

## 2023-09-30 RX ADMIN — SENNA PLUS 2 TABLET(S): 8.6 TABLET ORAL at 22:07

## 2023-09-30 NOTE — PHYSICAL THERAPY INITIAL EVALUATION ADULT - PLANNED THERAPY INTERVENTIONS, PT EVAL
Stair training. Eval. Pt left in bed with all observation section equipment/material intact and bed alarm activated. Pt c/o pain sx site 6/10. Will cont to follow pt and increase as tolerated./bed mobility training/gait training/transfer training

## 2023-09-30 NOTE — PROGRESS NOTE ADULT - SUBJECTIVE AND OBJECTIVE BOX
C/c:  back pain with radiation to b/l LE    HPI: 68F, pmh of HTN, HLD, DMII, AI hepatitis, Hypothyroidism, Spinal stenosis who presented to the ER with intractable back pain/bilateral LE pain. She has knon spinal stenosis @L5-S1 with foraminal stenosis at L4-5. She was scheduled for surgery 10/23 with Dr. Johnson, but bc of intractable pain came to the ER. She is admitted to neurosx. Hospitalist service consulted for medical clearance/comanagement.     She is now s/p lumbar lami/fusion.     pt seen and examined this am. doing ok. Having pain at surgical site. using pca pump. no sob/chest pain. tolerating po.     ROS: all 10 systems reviewed and is as above otherwise negative.     Vital Signs Last 24 Hrs  T(C): 36.7 (30 Sep 2023 08:46), Max: 36.9 (30 Sep 2023 00:00)  T(F): 98.1 (30 Sep 2023 08:46), Max: 98.4 (30 Sep 2023 00:00)  HR: 69 (30 Sep 2023 08:46) (59 - 87)  BP: 133/77 (30 Sep 2023 08:46) (122/104 - 157/86)  RR: 18 (30 Sep 2023 08:46) (16 - 21)  SpO2: 100% (30 Sep 2023 08:46) (96% - 100%)    Parameters below as of 30 Sep 2023 08:46  Patient On (Oxygen Delivery Method): nasal cannula        PHYSICAL EXAM:  GENERAL: Comfortable, no acute distress   HEAD:  Normocephalic, atraumatic  EYES: EOMI, PERRLA  HEENT: Moist mucous membranes  NECK: Supple, No JVD  NERVOUS SYSTEM:  Alert & Oriented X3, Motor Strength 5/5 B/L upper and lower extremities  CHEST/LUNG: Clear to auscultation bilaterally  HEART: Regular rate and rhythm, +2/6 systolic murmur  ABDOMEN: Soft, Nontender, Nondistended, Bowel sounds present  GENITOURINARY: umaña  EXTREMITIES:   No clubbing, cyanosis, or edema  MUSCULOSKELETAL-dressing intact.  SKIN-no rash    LABS:                        12.0   11.28 )-----------( 283      ( 30 Sep 2023 07:23 )             35.0     09-30    138  |  106  |  9   ----------------------------<  142<H>  3.8   |  28  |  0.54    Ca    8.8      30 Sep 2023 07:23      PT/INR - ( 29 Sep 2023 08:13 )   PT: 11.0 sec;   INR: 0.97 ratio         PTT - ( 29 Sep 2023 08:13 )  PTT:27.7 sec  Urinalysis Basic - ( 30 Sep 2023 07:23 )    Color: x / Appearance: x / SG: x / pH: x  Gluc: 142 mg/dL / Ketone: x  / Bili: x / Urobili: x   Blood: x / Protein: x / Nitrite: x   Leuk Esterase: x / RBC: x / WBC x   Sq Epi: x / Non Sq Epi: x / Bacteria: x      MEDICATIONS  (STANDING):  gabapentin 600 milliGRAM(s) Oral three times a day  hydrocortisone 50 milliGRAM(s) Oral once  HYDROmorphone PCA (1 mG/mL) 30 milliLiter(s) PCA Continuous PCA Continuous  levothyroxine 88 MICROGram(s) Oral daily  losartan 100 milliGRAM(s) Oral daily  metoprolol tartrate 25 milliGRAM(s) Oral daily    MEDICATIONS  (PRN):  acetaminophen     Tablet .. 650 milliGRAM(s) Oral every 6 hours PRN Mild Pain (1 - 3)  acetaminophen   IVPB .. 1000 milliGRAM(s) IV Intermittent once PRN Moderate Pain (4 - 6)  cyclobenzaprine 10 milliGRAM(s) Oral three times a day PRN Muscle Spasm  HYDROmorphone PCA (1 mG/mL) Rescue Clinician Bolus 0.5 milliGRAM(s) IV Push every 15 minutes PRN for Pain Scale GREATER THAN 6  morphine  - Injectable 2 milliGRAM(s) IV Push every 4 hours PRN Severe Pain (7 - 10)  naloxone Injectable 0.1 milliGRAM(s) IV Push every 3 minutes PRN For ANY of the following changes in patient status:  A. RR LESS THAN 10 breaths per minute, B. Oxygen saturation LESS THAN 90%, C. Sedation score of 6  ondansetron Injectable 4 milliGRAM(s) IV Push every 6 hours PRN Nausea and/or Vomiting  ondansetron Injectable 4 milliGRAM(s) IV Push every 6 hours PRN Nausea        Assessment and Plan:   68F, pmh as above a/w    1. Lumbar spinal stenosis with radicular pain:  -s/p lumbar lami/fusion POD#1  -s/p hydrocortisone 100mg X 3 since yesterday.   -give 50mg more today, then 25mg Q8H starting tomorrow, then resume  home dose budesonide monday.   -pain control   -incentive spirometry  -bowel regimen.     2. HTN:  -continue bb, arb    3. HLD:  -statin    4. DMII  -hgba1c 6.1-->c/w prediabetes.  -hold metformin.   -off ss    5. Hypothyroid  -continue synthroid.     6. AI hepatitis:  -on budesonide 3mg daily (equivalent to 10mg prednisone daily)  -continue azathioprine.   -stress dose taper as above    7. DVT px:  -per primary team     #Dispo- OR today

## 2023-09-30 NOTE — PROGRESS NOTE ADULT - ASSESSMENT
69 yo F hx of spinal stenosis and foraminal stenosis with severe radiculopathy scheduled for surgery 10/23 with Dr. Johnson but patient states pain is intolerable. Plan to admit to Dr. Johnson Neurousurgery and book for surgery in the coming days. She will need medical clearance prior to surgery.     POD#1 s/p L3-L5 decompression and fusion, L4-L5 TLIF    PLAN-  Advance diet as tolerated  Pain control cont PCA for now  LSO when out of bed ordered with [facility] this AM  OOB with PT this afternoon pending brace  D/c umaña when OOB ambulating  Hospitalist following  Incentive spirometer   Consistent carbohydrate diet and sliding scale insulin as needed   Bowel regimen  SCDs DVT ppx    Discussed with Dr. Johnson

## 2023-09-30 NOTE — PHYSICAL THERAPY INITIAL EVALUATION ADULT - GAIT TRAINING, PT EVAL
By D/C: Pt will amb with appropriate device/no device 200' with supervision. By D/C: Pt will ascend/descend 5 steps with HR, step to gait pattern, and supervision.

## 2023-09-30 NOTE — PHYSICAL THERAPY INITIAL EVALUATION ADULT - GENERAL OBSERVATIONS, REHAB EVAL
Pt found supine in bed with bilateral venodynes, IV/PCA, umaña, and bed alarm activated. Pt c/o pain sx site 6/10. Nursing assistant utilized for translation.

## 2023-09-30 NOTE — PROGRESS NOTE ADULT - SUBJECTIVE AND OBJECTIVE BOX
Abi was awake and alert in bed and able to roll side to side for fitting of an Aspen LSO for OOB use. She was instructed on donning and adjustment of the brace , and given printed instructions and contact info. Brace was labeled and placed on bed side chair.    [facility]   [phone]

## 2023-10-01 LAB — GLUCOSE BLDC GLUCOMTR-MCNC: 148 MG/DL — HIGH (ref 70–99)

## 2023-10-01 PROCEDURE — 99232 SBSQ HOSP IP/OBS MODERATE 35: CPT

## 2023-10-01 RX ORDER — POLYETHYLENE GLYCOL 3350 17 G/17G
17 POWDER, FOR SOLUTION ORAL
Refills: 0 | Status: DISCONTINUED | OUTPATIENT
Start: 2023-10-01 | End: 2023-10-04

## 2023-10-01 RX ORDER — ACETAMINOPHEN 500 MG
1000 TABLET ORAL ONCE
Refills: 0 | Status: COMPLETED | OUTPATIENT
Start: 2023-10-01 | End: 2023-10-01

## 2023-10-01 RX ORDER — HYDROMORPHONE HYDROCHLORIDE 2 MG/ML
1 INJECTION INTRAMUSCULAR; INTRAVENOUS; SUBCUTANEOUS EVERY 4 HOURS
Refills: 0 | Status: DISCONTINUED | OUTPATIENT
Start: 2023-10-01 | End: 2023-10-04

## 2023-10-01 RX ORDER — OXYCODONE HYDROCHLORIDE 5 MG/1
10 TABLET ORAL EVERY 4 HOURS
Refills: 0 | Status: DISCONTINUED | OUTPATIENT
Start: 2023-10-01 | End: 2023-10-04

## 2023-10-01 RX ORDER — ENOXAPARIN SODIUM 100 MG/ML
40 INJECTION SUBCUTANEOUS EVERY 24 HOURS
Refills: 0 | Status: DISCONTINUED | OUTPATIENT
Start: 2023-10-01 | End: 2023-10-04

## 2023-10-01 RX ORDER — OXYCODONE HYDROCHLORIDE 5 MG/1
5 TABLET ORAL EVERY 4 HOURS
Refills: 0 | Status: DISCONTINUED | OUTPATIENT
Start: 2023-10-01 | End: 2023-10-04

## 2023-10-01 RX ADMIN — OXYCODONE HYDROCHLORIDE 10 MILLIGRAM(S): 5 TABLET ORAL at 09:56

## 2023-10-01 RX ADMIN — OXYCODONE HYDROCHLORIDE 10 MILLIGRAM(S): 5 TABLET ORAL at 10:45

## 2023-10-01 RX ADMIN — PANTOPRAZOLE SODIUM 40 MILLIGRAM(S): 20 TABLET, DELAYED RELEASE ORAL at 05:32

## 2023-10-01 RX ADMIN — Medication 25 MILLIGRAM(S): at 22:47

## 2023-10-01 RX ADMIN — GABAPENTIN 600 MILLIGRAM(S): 400 CAPSULE ORAL at 14:45

## 2023-10-01 RX ADMIN — OXYCODONE HYDROCHLORIDE 10 MILLIGRAM(S): 5 TABLET ORAL at 17:33

## 2023-10-01 RX ADMIN — Medication 25 MILLIGRAM(S): at 05:32

## 2023-10-01 RX ADMIN — LOSARTAN POTASSIUM 100 MILLIGRAM(S): 100 TABLET, FILM COATED ORAL at 09:56

## 2023-10-01 RX ADMIN — Medication 1000 MILLIGRAM(S): at 03:29

## 2023-10-01 RX ADMIN — POLYETHYLENE GLYCOL 3350 17 GRAM(S): 17 POWDER, FOR SOLUTION ORAL at 22:48

## 2023-10-01 RX ADMIN — Medication 25 MILLIGRAM(S): at 09:56

## 2023-10-01 RX ADMIN — Medication 400 MILLIGRAM(S): at 02:59

## 2023-10-01 RX ADMIN — ENOXAPARIN SODIUM 40 MILLIGRAM(S): 100 INJECTION SUBCUTANEOUS at 14:46

## 2023-10-01 RX ADMIN — POLYETHYLENE GLYCOL 3350 17 GRAM(S): 17 POWDER, FOR SOLUTION ORAL at 11:48

## 2023-10-01 RX ADMIN — GABAPENTIN 600 MILLIGRAM(S): 400 CAPSULE ORAL at 22:47

## 2023-10-01 RX ADMIN — SENNA PLUS 2 TABLET(S): 8.6 TABLET ORAL at 22:47

## 2023-10-01 RX ADMIN — Medication 88 MICROGRAM(S): at 05:32

## 2023-10-01 RX ADMIN — Medication 25 MILLIGRAM(S): at 14:45

## 2023-10-01 RX ADMIN — GABAPENTIN 600 MILLIGRAM(S): 400 CAPSULE ORAL at 05:32

## 2023-10-01 NOTE — PROGRESS NOTE ADULT - ASSESSMENT
69 yo F hx of spinal stenosis and foraminal stenosis with severe radiculopathy scheduled for surgery 10/23 with Dr. Johnson but patient states pain is intolerable. Plan to admit to Dr. Johnson Neurousurgery and book for surgery in the coming days. She will need medical clearance prior to surgery.     POD#2 s/p L3-L5 decompression and fusion, L4-L5 TLIF    PLAN-  Advance activity as tolerated  Pain control transition to oral pain meds  LSO when out of bed  OOB with PT   D/c [facility] following  Incentive spirometer   Consistent carbohydrate diet and sliding scale insulin as needed   Bowel regimen  Lovenox, SCDs DVT ppx    Discussed with Dr. Johnson

## 2023-10-01 NOTE — PROGRESS NOTE ADULT - SUBJECTIVE AND OBJECTIVE BOX
C/c:  back pain with radiation to b/l LE    HPI: 68F, pmh of HTN, HLD, DMII, AI hepatitis, Hypothyroidism, Spinal stenosis who presented to the ER with intractable back pain/bilateral LE pain. She has knon spinal stenosis @L5-S1 with foraminal stenosis at L4-5. She was scheduled for surgery 10/23 with Dr. Johnson, but bc of intractable pain came to the ER. She is admitted to neurosx. Hospitalist service consulted for medical clearance/comanagement.     She is now s/p lumbar lami/fusion.     pt seen and examined this am. Felt well. Pain markedly improved. +constipated. no issues with voiding. tolerating po. Ambulated with physical therapy.     ROS: all 10 systems reviewed and is as above otherwise negative.   Vital Signs Last 24 Hrs  T(C): 36.7 (01 Oct 2023 07:36), Max: 37 (01 Oct 2023 00:00)  T(F): 98 (01 Oct 2023 07:36), Max: 98.6 (01 Oct 2023 00:00)  HR: 76 (01 Oct 2023 07:36) (76 - 94)  BP: 119/63 (01 Oct 2023 07:36) (94/60 - 129/75)  RR: 18 (01 Oct 2023 07:36) (18 - 18)  SpO2: 97% (01 Oct 2023 07:36) (92% - 97%)    Parameters below as of 01 Oct 2023 07:36  Patient On (Oxygen Delivery Method): room air          PHYSICAL EXAM:  GENERAL: Comfortable, no acute distress   HEAD:  Normocephalic, atraumatic  EYES: EOMI, PERRLA  HEENT: Moist mucous membranes  NECK: Supple, No JVD  NERVOUS SYSTEM:  Alert & Oriented X3, Motor Strength 5/5 B/L upper and lower extremities  CHEST/LUNG: Clear to auscultation bilaterally  HEART: Regular rate and rhythm, +2/6 systolic murmur  ABDOMEN: Soft, Nontender, Nondistended, Bowel sounds present  GENITOURINARY: no palpable bladder  EXTREMITIES:   No clubbing, cyanosis, or edema  MUSCULOSKELETAL-dressing intact.  SKIN-no rash  LABS:                        12.0   11.28 )-----------( 283      ( 30 Sep 2023 07:23 )             35.0     09-30    138  |  106  |  9   ----------------------------<  142<H>  3.8   |  28  |  0.54    Ca    8.8      30 Sep 2023 07:23        Urinalysis Basic - ( 30 Sep 2023 07:23 )    Color: x / Appearance: x / SG: x / pH: x  Gluc: 142 mg/dL / Ketone: x  / Bili: x / Urobili: x   Blood: x / Protein: x / Nitrite: x   Leuk Esterase: x / RBC: x / WBC x   Sq Epi: x / Non Sq Epi: x / Bacteria: x  MEDICATIONS  (STANDING):  enoxaparin Injectable 40 milliGRAM(s) SubCutaneous every 24 hours  gabapentin 600 milliGRAM(s) Oral three times a day  hydrocortisone 25 milliGRAM(s) Oral every 8 hours  levothyroxine 88 MICROGram(s) Oral daily  losartan 100 milliGRAM(s) Oral daily  metoprolol tartrate 25 milliGRAM(s) Oral daily  pantoprazole    Tablet 40 milliGRAM(s) Oral before breakfast  polyethylene glycol 3350 17 Gram(s) Oral daily  senna 2 Tablet(s) Oral at bedtime    MEDICATIONS  (PRN):  acetaminophen     Tablet .. 650 milliGRAM(s) Oral every 6 hours PRN Mild Pain (1 - 3)  acetaminophen   IVPB .. 1000 milliGRAM(s) IV Intermittent once PRN Moderate Pain (4 - 6)  bisacodyl 5 milliGRAM(s) Oral every 12 hours PRN Constipation  cyclobenzaprine 10 milliGRAM(s) Oral three times a day PRN Muscle Spasm  HYDROmorphone  Injectable 1 milliGRAM(s) IV Push every 4 hours PRN Severe Pain (7 - 10)  morphine  - Injectable 2 milliGRAM(s) IV Push every 4 hours PRN Severe Pain (7 - 10)  naloxone Injectable 0.1 milliGRAM(s) IV Push every 3 minutes PRN For ANY of the following changes in patient status:  A. RR LESS THAN 10 breaths per minute, B. Oxygen saturation LESS THAN 90%, C. Sedation score of 6  ondansetron Injectable 4 milliGRAM(s) IV Push every 6 hours PRN Nausea  ondansetron Injectable 4 milliGRAM(s) IV Push every 6 hours PRN Nausea and/or Vomiting  oxyCODONE    IR 10 milliGRAM(s) Oral every 4 hours PRN Moderate Pain (4 - 6)  oxyCODONE    IR 5 milliGRAM(s) Oral every 4 hours PRN Mild Pain (1 - 3)            Assessment and Plan:   68F, pmh as above a/w    1. Lumbar spinal stenosis with radicular pain:  -s/p lumbar lami/fusion POD#2  -pain control   -incentive spirometry  -bowel regimen.     2. HTN:  -continue bb, arb    3. HLD:  -statin    4. DMII  -hgba1c 6.1-->c/w prediabetes.  -hold metformin.   -off ss    5. Hypothyroid  -continue synthroid.     6. AI hepatitis:  -on budesonide 3mg daily (equivalent to 10mg prednisone daily)  -on hydrocortisone 25mg Q8H today, resume home dose budesonide tomorrow.   -continue azathioprine.   -stress dose taper as above    7. DVT px:  -per primary team    No

## 2023-10-01 NOTE — PROGRESS NOTE ADULT - SUBJECTIVE AND OBJECTIVE BOX
HPI:  67 yo F hx of back pain, hypothyroidism, autoimmune hepatitis, HLD, DM Type II, HTN, degenerative disc disease and spinal stenosis is presenting with intolerable bilateral lower extremity pain and back pain x months. She was recently seen in the ED in September for similar symptoms but was discharged home with pain meds and instructed to follow up at her schedule appointment outpatient with Dr. Johnson. Her MRI at the time revealed multilevel degenerative disc disease, high grade stenosis at L5-S1 and marked foraminal stenosis at L4-L5. She is scheduled for surgery on 10/23 with Dr. Johnson but she states she cannot wait that long because the pain has become unbearable. She has been taking Gabapentin, Flexeril and Naproxen at home without relief. The pain is not better or worse with change in position. She states it is constant and wakes her up at night. She has no urinary retention or fecal incontinence.      9/26- Pt seen and examined on 2N, no events overnight, states she didn't sleep well, still c/o back pain and still c/o pain in her anterior thighs, surgery is scheduled for this coming Friday.     9/27- Pt seen and examined, no events overnight continued back pain, worse at night, hospitalist following for medical clearance, surgery scheduled for this Friday, will need Pre-op labs on Thursday 9/28- Pt seen and examined, no events overnight, plan for OR tomorrow, medical clearance is on the chart     9/29- OR for L3-L5 decompression and fusion, L4-L5 TLIF    9/30- POD#1 Patient seen and examined with stephanie Valenzuela at bedside to translate. She reports pain across her lower back into her hips b/l. She denies pain radiating down the legs, no numb/tingling + PCA, + umaña    10/1- POD#2 Patient seen and examined with aide to translate. Sitting up in chair, complains of incisional pain across lower back into hips, occasionally front of her thighs. She denies numb/tingling, weakness.     Vital Signs Last 24 Hrs  T(C): 36.7 (01 Oct 2023 07:36), Max: 37 (01 Oct 2023 00:00)  T(F): 98 (01 Oct 2023 07:36), Max: 98.6 (01 Oct 2023 00:00)  HR: 76 (01 Oct 2023 07:36) (76 - 94)  BP: 119/63 (01 Oct 2023 07:36) (94/60 - 129/75)  BP(mean): --  RR: 18 (01 Oct 2023 07:36) (18 - 18)  SpO2: 97% (01 Oct 2023 07:36) (92% - 97%)    Parameters below as of 01 Oct 2023 07:36  Patient On (Oxygen Delivery Method): room air        MEDICATIONS  (STANDING):  enoxaparin Injectable 40 milliGRAM(s) SubCutaneous every 24 hours  gabapentin 600 milliGRAM(s) Oral three times a day  hydrocortisone 25 milliGRAM(s) Oral every 8 hours  levothyroxine 88 MICROGram(s) Oral daily  losartan 100 milliGRAM(s) Oral daily  metoprolol tartrate 25 milliGRAM(s) Oral daily  pantoprazole    Tablet 40 milliGRAM(s) Oral before breakfast  polyethylene glycol 3350 17 Gram(s) Oral daily  senna 2 Tablet(s) Oral at bedtime    MEDICATIONS  (PRN):  acetaminophen     Tablet .. 650 milliGRAM(s) Oral every 6 hours PRN Mild Pain (1 - 3)  acetaminophen   IVPB .. 1000 milliGRAM(s) IV Intermittent once PRN Moderate Pain (4 - 6)  bisacodyl 5 milliGRAM(s) Oral every 12 hours PRN Constipation  cyclobenzaprine 10 milliGRAM(s) Oral three times a day PRN Muscle Spasm  HYDROmorphone  Injectable 1 milliGRAM(s) IV Push every 4 hours PRN Severe Pain (7 - 10)  morphine  - Injectable 2 milliGRAM(s) IV Push every 4 hours PRN Severe Pain (7 - 10)  naloxone Injectable 0.1 milliGRAM(s) IV Push every 3 minutes PRN For ANY of the following changes in patient status:  A. RR LESS THAN 10 breaths per minute, B. Oxygen saturation LESS THAN 90%, C. Sedation score of 6  ondansetron Injectable 4 milliGRAM(s) IV Push every 6 hours PRN Nausea  ondansetron Injectable 4 milliGRAM(s) IV Push every 6 hours PRN Nausea and/or Vomiting  oxyCODONE    IR 5 milliGRAM(s) Oral every 4 hours PRN Mild Pain (1 - 3)  oxyCODONE    IR 10 milliGRAM(s) Oral every 4 hours PRN Moderate Pain (4 - 6)      PHYSICAL EXAM:  Constitutional: awake and alert, sitting up in a chair   HEENT: PERRLA, EOMI, hearing intact   Neck: Supple  Respiratory: Breath sounds are clear bilaterally  Cardiovascular: S1 and S2, regular rhythm  Gastrointestinal: soft, nontender  Extremities:  no edema  Musculoskeletal: no abnormal movements  Skin: Dressing c/d/i    Neurological exam:  HF: A x O x 3. Appropriately interactive, normal affect. Speech fluent in Estonian, no Aphasia or paraphasic errors. Naming /repetition intact   CN: MARGARET, EOMI, VFF, facial sensation normal, no NLFD, tongue midline, Palate moves equally, SCM equal bilaterally  Motor: No pronator drift, Strength 5/5 in all 4 ext, normal bulk and tone, no tremor, rigidity or bradykinesia.    Sens: Intact to light touch  Reflexes: Symmetric and normal, downgoing toes b/l, no clonus   Coord:  No FNFA, dysmetria, KRISTI intact   Gait/Balance: Not tested           LABS:                         12.0   11.28 )-----------( 283      ( 30 Sep 2023 07:23 )             35.0     09-30    138  |  106  |  9   ----------------------------<  142<H>  3.8   |  28  |  0.54    Ca    8.8      30 Sep 2023 07:23

## 2023-10-02 LAB
ANION GAP SERPL CALC-SCNC: 3 MMOL/L — LOW (ref 5–17)
BUN SERPL-MCNC: 8 MG/DL — SIGNIFICANT CHANGE UP (ref 7–23)
CALCIUM SERPL-MCNC: 8.3 MG/DL — LOW (ref 8.5–10.1)
CHLORIDE SERPL-SCNC: 109 MMOL/L — HIGH (ref 96–108)
CO2 SERPL-SCNC: 32 MMOL/L — HIGH (ref 22–31)
CREAT SERPL-MCNC: 0.5 MG/DL — SIGNIFICANT CHANGE UP (ref 0.5–1.3)
EGFR: 101 ML/MIN/1.73M2 — SIGNIFICANT CHANGE UP
GLUCOSE BLDC GLUCOMTR-MCNC: 120 MG/DL — HIGH (ref 70–99)
GLUCOSE BLDC GLUCOMTR-MCNC: 148 MG/DL — HIGH (ref 70–99)
GLUCOSE SERPL-MCNC: 127 MG/DL — HIGH (ref 70–99)
HCT VFR BLD CALC: 28.6 % — LOW (ref 34.5–45)
HGB BLD-MCNC: 9.6 G/DL — LOW (ref 11.5–15.5)
MCHC RBC-ENTMCNC: 30.3 PG — SIGNIFICANT CHANGE UP (ref 27–34)
MCHC RBC-ENTMCNC: 33.6 GM/DL — SIGNIFICANT CHANGE UP (ref 32–36)
MCV RBC AUTO: 90.2 FL — SIGNIFICANT CHANGE UP (ref 80–100)
PHOSPHATE SERPL-MCNC: 2.5 MG/DL — SIGNIFICANT CHANGE UP (ref 2.5–4.5)
PLATELET # BLD AUTO: 252 K/UL — SIGNIFICANT CHANGE UP (ref 150–400)
POTASSIUM SERPL-MCNC: 3.6 MMOL/L — SIGNIFICANT CHANGE UP (ref 3.5–5.3)
POTASSIUM SERPL-SCNC: 3.6 MMOL/L — SIGNIFICANT CHANGE UP (ref 3.5–5.3)
RBC # BLD: 3.17 M/UL — LOW (ref 3.8–5.2)
RBC # FLD: 14.9 % — HIGH (ref 10.3–14.5)
SODIUM SERPL-SCNC: 144 MMOL/L — SIGNIFICANT CHANGE UP (ref 135–145)
WBC # BLD: 7.87 K/UL — SIGNIFICANT CHANGE UP (ref 3.8–10.5)
WBC # FLD AUTO: 7.87 K/UL — SIGNIFICANT CHANGE UP (ref 3.8–10.5)

## 2023-10-02 PROCEDURE — 99232 SBSQ HOSP IP/OBS MODERATE 35: CPT

## 2023-10-02 RX ADMIN — Medication 3 MILLIGRAM(S): at 09:01

## 2023-10-02 RX ADMIN — POLYETHYLENE GLYCOL 3350 17 GRAM(S): 17 POWDER, FOR SOLUTION ORAL at 09:01

## 2023-10-02 RX ADMIN — OXYCODONE HYDROCHLORIDE 10 MILLIGRAM(S): 5 TABLET ORAL at 03:32

## 2023-10-02 RX ADMIN — OXYCODONE HYDROCHLORIDE 10 MILLIGRAM(S): 5 TABLET ORAL at 18:48

## 2023-10-02 RX ADMIN — Medication 88 MICROGRAM(S): at 05:19

## 2023-10-02 RX ADMIN — GABAPENTIN 600 MILLIGRAM(S): 400 CAPSULE ORAL at 13:47

## 2023-10-02 RX ADMIN — POLYETHYLENE GLYCOL 3350 17 GRAM(S): 17 POWDER, FOR SOLUTION ORAL at 21:39

## 2023-10-02 RX ADMIN — OXYCODONE HYDROCHLORIDE 10 MILLIGRAM(S): 5 TABLET ORAL at 09:01

## 2023-10-02 RX ADMIN — CYCLOBENZAPRINE HYDROCHLORIDE 10 MILLIGRAM(S): 10 TABLET, FILM COATED ORAL at 05:21

## 2023-10-02 RX ADMIN — SENNA PLUS 2 TABLET(S): 8.6 TABLET ORAL at 21:39

## 2023-10-02 RX ADMIN — ENOXAPARIN SODIUM 40 MILLIGRAM(S): 100 INJECTION SUBCUTANEOUS at 13:47

## 2023-10-02 RX ADMIN — GABAPENTIN 600 MILLIGRAM(S): 400 CAPSULE ORAL at 21:39

## 2023-10-02 RX ADMIN — Medication 25 MILLIGRAM(S): at 09:02

## 2023-10-02 RX ADMIN — PANTOPRAZOLE SODIUM 40 MILLIGRAM(S): 20 TABLET, DELAYED RELEASE ORAL at 06:10

## 2023-10-02 RX ADMIN — GABAPENTIN 600 MILLIGRAM(S): 400 CAPSULE ORAL at 05:19

## 2023-10-02 RX ADMIN — OXYCODONE HYDROCHLORIDE 10 MILLIGRAM(S): 5 TABLET ORAL at 09:31

## 2023-10-02 RX ADMIN — LOSARTAN POTASSIUM 100 MILLIGRAM(S): 100 TABLET, FILM COATED ORAL at 09:02

## 2023-10-02 RX ADMIN — OXYCODONE HYDROCHLORIDE 10 MILLIGRAM(S): 5 TABLET ORAL at 19:18

## 2023-10-02 RX ADMIN — OXYCODONE HYDROCHLORIDE 10 MILLIGRAM(S): 5 TABLET ORAL at 03:02

## 2023-10-02 NOTE — PROGRESS NOTE ADULT - ASSESSMENT
69 yo F hx of spinal stenosis and foraminal stenosis with severe radiculopathy scheduled for surgery 10/23 with Dr. Johnson but patient states pain is intolerable. Plan to admit to Dr. Johnson Neurousurgery and book for surgery in the coming days. She will need medical clearance prior to surgery.     POD#3 s/p L3-L5 decompression and fusion, L4-L5 TLIF    PLAN-  Advance activity as tolerated  Pain controlled on oral pain meds  LSO when out of bed  OOB with PT   voiding without umaña  Hospitalist following  encouraged to continue to use Incentive spirometer   Consistent carbohydrate diet and sliding scale insulin as needed   Bowel regimen  Lovenox, SCDs DVT ppx    Discussed with Dr. Johnson this am

## 2023-10-02 NOTE — PROGRESS NOTE ADULT - SUBJECTIVE AND OBJECTIVE BOX
69 yo F hx of back pain, hypothyroidism, autoimmune hepatitis, HLD, DM Type II, HTN, degenerative disc disease and spinal stenosis is presenting with intolerable bilateral lower extremity pain and back pain x months. She was recently seen in the ED in September for similar symptoms but was discharged home with pain meds and instructed to follow up at her schedule appointment outpatient with Dr. Johnson. Her MRI at the time revealed multilevel degenerative disc disease, high grade stenosis at L5-S1 and marked foraminal stenosis at L4-L5. She is scheduled for surgery on 10/23 with Dr. Johnson but she states she cannot wait that long because the pain has become unbearable. She has been taking Gabapentin, Flexeril and Naproxen at home without relief. The pain is not better or worse with change in position. She states it is constant and wakes her up at night. She has no urinary retention or fecal incontinence.      9/26- Pt seen and examined on 2N, no events overnight, states she didn't sleep well, still c/o back pain and still c/o pain in her anterior thighs, surgery is scheduled for this coming Friday.     9/27- Pt seen and examined, no events overnight continued back pain, worse at night, hospitalist following for medical clearance, surgery scheduled for this Friday, will need Pre-op labs on Thursday 9/28- Pt seen and examined, no events overnight, plan for OR tomorrow, medical clearance is on the chart     9/29- OR for L3-L5 decompression and fusion, L4-L5 TLIF    9/30- POD#1 Patient seen and examined with Dr. Johnson, rosye at bedside to translate. She reports pain across her lower back into her hips b/l. She denies pain radiating down the legs, no numb/tingling + PCA, + umaña    10/1- POD#2 Patient seen and examined with aide to translate. Sitting up in chair, complains of incisional pain across lower back into hips, occasionally front of her thighs. She denies numb/tingling, weakness.     10/2 POD#3 patient seen and examined earlier today. Patient ambulating in room without assist. Patient notes only mild incisional pain    Vital Signs Last 24 Hrs  T(C): 37.1 (02 Oct 2023 07:47), Max: 37.4 (01 Oct 2023 16:00)  T(F): 98.8 (02 Oct 2023 07:47), Max: 99.4 (02 Oct 2023 00:04)  HR: 72 (02 Oct 2023 07:47) (72 - 85)  BP: 139/70 (02 Oct 2023 07:47) (137/70 - 146/69)  BP(mean): --  RR: 18 (02 Oct 2023 07:47) (18 - 18)  SpO2: 98% (02 Oct 2023 07:47) (98% - 98%)    Parameters below as of 02 Oct 2023 07:47  Patient On (Oxygen Delivery Method): room air                          9.6    7.87  )-----------( 252      ( 02 Oct 2023 07:48 )             28.6   10-02    144  |  109<H>  |  8   ----------------------------<  127<H>  3.6   |  32<H>  |  0.50    Ca    8.3<L>      02 Oct 2023 07:48  Phos  2.5     10-02    MEDICATIONS  (STANDING):  buDESOnide    EC Capsule 3 milliGRAM(s) Oral daily  enoxaparin Injectable 40 milliGRAM(s) SubCutaneous every 24 hours  gabapentin 600 milliGRAM(s) Oral three times a day  levothyroxine 88 MICROGram(s) Oral daily  losartan 100 milliGRAM(s) Oral daily  metoprolol tartrate 25 milliGRAM(s) Oral daily  pantoprazole    Tablet 40 milliGRAM(s) Oral before breakfast  polyethylene glycol 3350 17 Gram(s) Oral two times a day  senna 2 Tablet(s) Oral at bedtime    MEDICATIONS  (PRN):  acetaminophen     Tablet .. 650 milliGRAM(s) Oral every 6 hours PRN Mild Pain (1 - 3)  acetaminophen   IVPB .. 1000 milliGRAM(s) IV Intermittent once PRN Moderate Pain (4 - 6)  bisacodyl 5 milliGRAM(s) Oral every 12 hours PRN Constipation  cyclobenzaprine 10 milliGRAM(s) Oral three times a day PRN Muscle Spasm  HYDROmorphone  Injectable 1 milliGRAM(s) IV Push every 4 hours PRN Severe Pain (7 - 10)  morphine  - Injectable 2 milliGRAM(s) IV Push every 4 hours PRN Severe Pain (7 - 10)  naloxone Injectable 0.1 milliGRAM(s) IV Push every 3 minutes PRN For ANY of the following changes in patient status:  A. RR LESS THAN 10 breaths per minute, B. Oxygen saturation LESS THAN 90%, C. Sedation score of 6  ondansetron Injectable 4 milliGRAM(s) IV Push every 6 hours PRN Nausea and/or Vomiting  ondansetron Injectable 4 milliGRAM(s) IV Push every 6 hours PRN Nausea  oxyCODONE    IR 5 milliGRAM(s) Oral every 4 hours PRN Mild Pain (1 - 3)  oxyCODONE    IR 10 milliGRAM(s) Oral every 4 hours PRN Moderate Pain (4 - 6)    PHYSICAL EXAM:  Constitutional: awake and alert, sitting up in a chair   HEENT: PERRLA, EOMI, hearing intact   Neck: Supple  Respiratory: Breath sounds are clear bilaterally  Cardiovascular: S1 and S2, regular rhythm  Gastrointestinal: soft, nontender  Extremities:  no edema  Musculoskeletal: no abnormal movements  Skin: Dressing c/d/i    Neurological exam:  HF: A x O x 3. Appropriately interactive, normal affect. Speech fluent in Bulgarian, no Aphasia or paraphasic errors. Naming /repetition intact   CN: MARGARET, EOMI, VFF, facial sensation normal, no NLFD, tongue midline, Palate moves equally, SCM equal bilaterally  Motor: No pronator drift, Strength 5/5 in all 4 ext, normal bulk and tone, no tremor, rigidity or bradykinesia.    Sens: Intact to light touch  Reflexes: Symmetric and normal, downgoing toes b/l, no clonus   Coord:  No FNFA, dysmetria, KRISTI intact   Gait/Balance: steady without ataxia

## 2023-10-02 NOTE — PROGRESS NOTE ADULT - SUBJECTIVE AND OBJECTIVE BOX
C/c:  back pain with radiation to b/l LE    HPI: 68F, pmh of HTN, HLD, DMII, AI hepatitis, Hypothyroidism, Spinal stenosis who presented to the ER with intractable back pain/bilateral LE pain. She has knon spinal stenosis @L5-S1 with foraminal stenosis at L4-5. She was scheduled for surgery 10/23 with Dr. Johnson, but bc of intractable pain came to the ER. She is admitted to neurosx. Hospitalist service consulted for medical clearance/comanagement.     She is now s/p lumbar lami/fusion.     pt seen and examined this am. having more pain today. no sob/chest pain. ambulated with physical therapy.     ROS: all 10 systems reviewed and is as above otherwise negative.     Vital Signs Last 24 Hrs  T(C): 37.1 (02 Oct 2023 07:47), Max: 37.4 (01 Oct 2023 16:00)  T(F): 98.8 (02 Oct 2023 07:47), Max: 99.4 (02 Oct 2023 00:04)  HR: 72 (02 Oct 2023 07:47) (72 - 85)  BP: 139/70 (02 Oct 2023 07:47) (137/70 - 146/69)  RR: 18 (02 Oct 2023 07:47) (18 - 18)  SpO2: 98% (02 Oct 2023 07:47) (98% - 98%)    Parameters below as of 02 Oct 2023 07:47  Patient On (Oxygen Delivery Method): room air          PHYSICAL EXAM:  GENERAL: Comfortable, no acute distress   HEAD:  Normocephalic, atraumatic  EYES: EOMI, PERRLA  HEENT: Moist mucous membranes  NECK: Supple, No JVD  NERVOUS SYSTEM:  Alert & Oriented X3, Motor Strength 5/5 B/L upper and lower extremities  CHEST/LUNG: Clear to auscultation bilaterally  HEART: Regular rate and rhythm, +2/6 systolic murmur  ABDOMEN: Soft, Nontender, Nondistended, Bowel sounds present  GENITOURINARY: no palpable bladder  EXTREMITIES:   No clubbing, cyanosis, or edema  MUSCULOSKELETAL-dressing intact.  SKIN-no rash    LABS:                        9.6    7.87  )-----------( 252      ( 02 Oct 2023 07:48 )             28.6     10-02    144  |  109<H>  |  8   ----------------------------<  127<H>  3.6   |  32<H>  |  0.50    Ca    8.3<L>      02 Oct 2023 07:48  Phos  2.5     10-02        Urinalysis Basic - ( 02 Oct 2023 07:48 )    Color: x / Appearance: x / SG: x / pH: x  Gluc: 127 mg/dL / Ketone: x  / Bili: x / Urobili: x   Blood: x / Protein: x / Nitrite: x   Leuk Esterase: x / RBC: x / WBC x   Sq Epi: x / Non Sq Epi: x / Bacteria: x      MEDICATIONS  (STANDING):  buDESOnide    EC Capsule 3 milliGRAM(s) Oral daily  enoxaparin Injectable 40 milliGRAM(s) SubCutaneous every 24 hours  gabapentin 600 milliGRAM(s) Oral three times a day  levothyroxine 88 MICROGram(s) Oral daily  losartan 100 milliGRAM(s) Oral daily  metoprolol tartrate 25 milliGRAM(s) Oral daily  pantoprazole    Tablet 40 milliGRAM(s) Oral before breakfast  polyethylene glycol 3350 17 Gram(s) Oral two times a day  senna 2 Tablet(s) Oral at bedtime    MEDICATIONS  (PRN):  acetaminophen     Tablet .. 650 milliGRAM(s) Oral every 6 hours PRN Mild Pain (1 - 3)  acetaminophen   IVPB .. 1000 milliGRAM(s) IV Intermittent once PRN Moderate Pain (4 - 6)  bisacodyl 5 milliGRAM(s) Oral every 12 hours PRN Constipation  cyclobenzaprine 10 milliGRAM(s) Oral three times a day PRN Muscle Spasm  HYDROmorphone  Injectable 1 milliGRAM(s) IV Push every 4 hours PRN Severe Pain (7 - 10)  morphine  - Injectable 2 milliGRAM(s) IV Push every 4 hours PRN Severe Pain (7 - 10)  naloxone Injectable 0.1 milliGRAM(s) IV Push every 3 minutes PRN For ANY of the following changes in patient status:  A. RR LESS THAN 10 breaths per minute, B. Oxygen saturation LESS THAN 90%, C. Sedation score of 6  ondansetron Injectable 4 milliGRAM(s) IV Push every 6 hours PRN Nausea  ondansetron Injectable 4 milliGRAM(s) IV Push every 6 hours PRN Nausea and/or Vomiting  oxyCODONE    IR 10 milliGRAM(s) Oral every 4 hours PRN Moderate Pain (4 - 6)  oxyCODONE    IR 5 milliGRAM(s) Oral every 4 hours PRN Mild Pain (1 - 3)        Assessment and Plan:   68F, pmh as above a/w    1. Lumbar spinal stenosis with radicular pain:  -s/p lumbar lami/fusion POD#3  -pain control   -incentive spirometry  -bowel regimen.     2. Acute blood loss anemia:  -d/t surgery  -no need for transfusion at this time.     3. HTN:  -continue bb, arb    4. HLD:  -statin    5. DMII  -hgba1c 6.1-->c/w prediabetes.  -hold metformin.   -off ss  -dc bgms    6. Hypothyroid  -continue synthroid.     7. AI hepatitis:  -on budesonide 3mg daily (equivalent to 10mg prednisone daily)  -continue azathioprine.   -s/p periop stress dose steroids.     8. DVT px:  -per primary team

## 2023-10-03 LAB — GLUCOSE BLDC GLUCOMTR-MCNC: 112 MG/DL — HIGH (ref 70–99)

## 2023-10-03 PROCEDURE — 99232 SBSQ HOSP IP/OBS MODERATE 35: CPT

## 2023-10-03 RX ADMIN — Medication 650 MILLIGRAM(S): at 08:36

## 2023-10-03 RX ADMIN — Medication 88 MICROGRAM(S): at 05:20

## 2023-10-03 RX ADMIN — LOSARTAN POTASSIUM 100 MILLIGRAM(S): 100 TABLET, FILM COATED ORAL at 09:51

## 2023-10-03 RX ADMIN — ENOXAPARIN SODIUM 40 MILLIGRAM(S): 100 INJECTION SUBCUTANEOUS at 13:31

## 2023-10-03 RX ADMIN — OXYCODONE HYDROCHLORIDE 10 MILLIGRAM(S): 5 TABLET ORAL at 18:23

## 2023-10-03 RX ADMIN — PANTOPRAZOLE SODIUM 40 MILLIGRAM(S): 20 TABLET, DELAYED RELEASE ORAL at 05:20

## 2023-10-03 RX ADMIN — GABAPENTIN 600 MILLIGRAM(S): 400 CAPSULE ORAL at 05:20

## 2023-10-03 RX ADMIN — POLYETHYLENE GLYCOL 3350 17 GRAM(S): 17 POWDER, FOR SOLUTION ORAL at 09:51

## 2023-10-03 RX ADMIN — GABAPENTIN 600 MILLIGRAM(S): 400 CAPSULE ORAL at 13:30

## 2023-10-03 RX ADMIN — OXYCODONE HYDROCHLORIDE 10 MILLIGRAM(S): 5 TABLET ORAL at 23:50

## 2023-10-03 RX ADMIN — Medication 25 MILLIGRAM(S): at 09:52

## 2023-10-03 RX ADMIN — OXYCODONE HYDROCHLORIDE 10 MILLIGRAM(S): 5 TABLET ORAL at 22:19

## 2023-10-03 RX ADMIN — OXYCODONE HYDROCHLORIDE 10 MILLIGRAM(S): 5 TABLET ORAL at 17:53

## 2023-10-03 RX ADMIN — Medication 5 MILLIGRAM(S): at 08:06

## 2023-10-03 RX ADMIN — Medication 650 MILLIGRAM(S): at 08:06

## 2023-10-03 RX ADMIN — GABAPENTIN 600 MILLIGRAM(S): 400 CAPSULE ORAL at 22:16

## 2023-10-03 RX ADMIN — SENNA PLUS 2 TABLET(S): 8.6 TABLET ORAL at 22:19

## 2023-10-03 RX ADMIN — Medication 3 MILLIGRAM(S): at 09:51

## 2023-10-03 NOTE — PROGRESS NOTE ADULT - SUBJECTIVE AND OBJECTIVE BOX
C/c:  back pain with radiation to b/l LE    HPI: 68F, pmh of HTN, HLD, DMII, AI hepatitis, Hypothyroidism, Spinal stenosis who presented to the ER with intractable back pain/bilateral LE pain. She has knon spinal stenosis @L5-S1 with foraminal stenosis at L4-5. She was scheduled for surgery 10/23 with Dr. Johnson, but bc of intractable pain came to the ER. She is admitted to neurosx. Hospitalist service consulted for medical clearance/comanagement.     10/3/23- up in a chair, has some pain. Brace is on    ROS: all 10 systems reviewed and is as above otherwise negative.     Vital Signs Last 24 Hrs  T(C): 36.9 (03 Oct 2023 11:38), Max: 37.3 (03 Oct 2023 08:00)  T(F): 98.4 (03 Oct 2023 11:38), Max: 99.1 (03 Oct 2023 08:00)  HR: 85 (03 Oct 2023 08:00) (73 - 86)  BP: 136/89 (03 Oct 2023 08:00) (123/72 - 139/74)  BP(mean): --  RR: 17 (03 Oct 2023 08:00) (17 - 18)  SpO2: 96% (03 Oct 2023 08:00) (96% - 97%)    Parameters below as of 03 Oct 2023 08:00  Patient On (Oxygen Delivery Method): room air    PHYSICAL EXAM:  GENERAL: Comfortable, no acute distress   HEAD:  Normocephalic, atraumatic  EYES: EOMI, PERRLA  HEENT: Moist mucous membranes  NECK: Supple, No JVD  NERVOUS SYSTEM:  Alert & Oriented X3, Motor Strength 5/5 B/L upper and lower extremities  CHEST/LUNG: Clear to auscultation bilaterally  HEART: Regular rate and rhythm, +2/6 systolic murmur  ABDOMEN: Soft, Nontender, Nondistended, Bowel sounds present  GENITOURINARY: no palpable bladder  EXTREMITIES:   No clubbing, cyanosis, or edema  MUSCULOSKELETAL-dressing intact.  SKIN-no rash    LABS:                                   9.6    7.87  )-----------( 252      ( 02 Oct 2023 07:48 )             28.6     02 Oct 2023 07:48    144    |  109    |  8      ----------------------------<  127    3.6     |  32     |  0.50     Ca    8.3        02 Oct 2023 07:48  Phos  2.5       02 Oct 2023 07:48    CAPILLARY BLOOD GLUCOSE    POCT Blood Glucose.: 112 mg/dL (03 Oct 2023 07:48)    Urinalysis Basic - ( 02 Oct 2023 07:48 )  Color: x / Appearance: x / SG: x / pH: x  Gluc: 127 mg/dL / Ketone: x  / Bili: x / Urobili: x   Blood: x / Protein: x / Nitrite: x   Leuk Esterase: x / RBC: x / WBC x   Sq Epi: x / Non Sq Epi: x / Bacteria: x    MEDICATIONS  (STANDING):  buDESOnide    EC Capsule 3 milliGRAM(s) Oral daily  enoxaparin Injectable 40 milliGRAM(s) SubCutaneous every 24 hours  gabapentin 600 milliGRAM(s) Oral three times a day  levothyroxine 88 MICROGram(s) Oral daily  losartan 100 milliGRAM(s) Oral daily  metoprolol tartrate 25 milliGRAM(s) Oral daily  pantoprazole    Tablet 40 milliGRAM(s) Oral before breakfast  polyethylene glycol 3350 17 Gram(s) Oral two times a day  senna 2 Tablet(s) Oral at bedtime    MEDICATIONS  (PRN):  acetaminophen     Tablet .. 650 milliGRAM(s) Oral every 6 hours PRN Mild Pain (1 - 3)  acetaminophen   IVPB .. 1000 milliGRAM(s) IV Intermittent once PRN Moderate Pain (4 - 6)  bisacodyl 5 milliGRAM(s) Oral every 12 hours PRN Constipation  cyclobenzaprine 10 milliGRAM(s) Oral three times a day PRN Muscle Spasm  HYDROmorphone  Injectable 1 milliGRAM(s) IV Push every 4 hours PRN Severe Pain (7 - 10)  morphine  - Injectable 2 milliGRAM(s) IV Push every 4 hours PRN Severe Pain (7 - 10)  naloxone Injectable 0.1 milliGRAM(s) IV Push every 3 minutes PRN For ANY of the following changes in patient status:  A. RR LESS THAN 10 breaths per minute, B. Oxygen saturation LESS THAN 90%, C. Sedation score of 6  ondansetron Injectable 4 milliGRAM(s) IV Push every 6 hours PRN Nausea  ondansetron Injectable 4 milliGRAM(s) IV Push every 6 hours PRN Nausea and/or Vomiting  oxyCODONE    IR 10 milliGRAM(s) Oral every 4 hours PRN Moderate Pain (4 - 6)  oxyCODONE    IR 5 milliGRAM(s) Oral every 4 hours PRN Mild Pain (1 - 3)      Assessment and Plan:   68F, pmh as above a/w    1. Lumbar spinal stenosis with radicular pain:  -s/p lumbar lami/fusion POD#4  -pain control   -incentive spirometry  -bowel regimen.     2. Acute blood loss anemia:  -d/t surgery  -no need for transfusion at this time.     3. HTN:  -continue bb, arb    4. HLD:  -statin    5. DMII  -hgba1c 6.1-->c/w prediabetes.  -hold metformin- resume on discharge   -off ss  -dc bgms    6. Hypothyroid  -continue synthroid.     7. AI hepatitis:  -on budesonide 3mg daily (equivalent to 10mg prednisone daily)  -continue azathioprine.   -s/p periop stress dose steroids.     8. DVT px:  -per primary team/ lovenox

## 2023-10-03 NOTE — PROGRESS NOTE ADULT - SUBJECTIVE AND OBJECTIVE BOX
HPI:  67 yo F hx of back pain, hypothyroidism, autoimmune hepatitis, HLD, DM Type II, HTN, degenerative disc disease and spinal stenosis is presenting with intolerable bilateral lower extremity pain and back pain x months. She was recently seen in the ED in September for similar symptoms but was discharged home with pain meds and instructed to follow up at her schedule appointment outpatient with Dr. Johnson. Her MRI at the time revealed multilevel degenerative disc disease, high grade stenosis at L5-S1 and marked foraminal stenosis at L4-L5. She is scheduled for surgery on 10/23 with Dr. Johnson but she states she cannot wait that long because the pain has become unbearable. She has been taking Gabapentin, Flexeril and Naproxen at home without relief. The pain is not better or worse with change in position. She states it is constant and wakes her up at night. She has no urinary retention or fecal incontinence.      9/26- Pt seen and examined on 2N, no events overnight, states she didn't sleep well, still c/o back pain and still c/o pain in her anterior thighs, surgery is scheduled for this coming Friday.     9/27- Pt seen and examined, no events overnight continued back pain, worse at night, hospitalist following for medical clearance, surgery scheduled for this Friday, will need Pre-op labs on Thursday 9/28- Pt seen and examined, no events overnight, plan for OR tomorrow, medical clearance is on the chart     9/29- OR for L3-L5 decompression and fusion, L4-L5 TLIF    9/30- POD#1 Patient seen and examined with Dr. Johnson, stephanie at bedside to translate. She reports pain across her lower back into her hips b/l. She denies pain radiating down the legs, no numb/tingling + PCA, + umaña    10/1- POD#2 Patient seen and examined with aide to translate. Sitting up in chair, complains of incisional pain across lower back into hips, occasionally front of her thighs. She denies numb/tingling, weakness.     10/2 POD#3 patient seen and examined earlier today. Patient ambulating in room without assist. Patient notes only mild incisional pain    10/3-- POD #4, Pt seen and examined, still c/o incisional back pain, was able to ambulate with assistance     Vital Signs Last 24 Hrs  T(C): 36.9 (03 Oct 2023 11:38), Max: 37.3 (03 Oct 2023 08:00)  T(F): 98.4 (03 Oct 2023 11:38), Max: 99.1 (03 Oct 2023 08:00)  HR: 85 (03 Oct 2023 08:00) (73 - 86)  BP: 136/89 (03 Oct 2023 08:00) (123/72 - 139/74)  RR: 17 (03 Oct 2023 08:00) (17 - 18)  SpO2: 96% (03 Oct 2023 08:00) (96% - 97%)    Parameters below as of 03 Oct 2023 08:00  Patient On (Oxygen Delivery Method): room air    MEDICATIONS  (STANDING):  buDESOnide    EC Capsule 3 milliGRAM(s) Oral daily  enoxaparin Injectable 40 milliGRAM(s) SubCutaneous every 24 hours  gabapentin 600 milliGRAM(s) Oral three times a day  levothyroxine 88 MICROGram(s) Oral daily  losartan 100 milliGRAM(s) Oral daily  metoprolol tartrate 25 milliGRAM(s) Oral daily  pantoprazole    Tablet 40 milliGRAM(s) Oral before breakfast  polyethylene glycol 3350 17 Gram(s) Oral two times a day  senna 2 Tablet(s) Oral at bedtime    MEDICATIONS  (PRN):  acetaminophen     Tablet .. 650 milliGRAM(s) Oral every 6 hours PRN Mild Pain (1 - 3)  acetaminophen   IVPB .. 1000 milliGRAM(s) IV Intermittent once PRN Moderate Pain (4 - 6)  bisacodyl 5 milliGRAM(s) Oral every 12 hours PRN Constipation  cyclobenzaprine 10 milliGRAM(s) Oral three times a day PRN Muscle Spasm  HYDROmorphone  Injectable 1 milliGRAM(s) IV Push every 4 hours PRN Severe Pain (7 - 10)  naloxone Injectable 0.1 milliGRAM(s) IV Push every 3 minutes PRN For ANY of the following changes in patient status:  A. RR LESS THAN 10 breaths per minute, B. Oxygen saturation LESS THAN 90%, C. Sedation score of 6  ondansetron Injectable 4 milliGRAM(s) IV Push every 6 hours PRN Nausea and/or Vomiting  ondansetron Injectable 4 milliGRAM(s) IV Push every 6 hours PRN Nausea  oxyCODONE    IR 5 milliGRAM(s) Oral every 4 hours PRN Mild Pain (1 - 3)  oxyCODONE    IR 10 milliGRAM(s) Oral every 4 hours PRN Moderate Pain (4 - 6)    ROS: pertinent positives in HPI, all other ROS were reviewed and are negative     PHYSICAL EXAM:  Constitutional: awake and alert, sitting up in a chair   HEENT: PERRLA, EOMI, hearing intact   Neck: Supple  Respiratory: Breath sounds are clear bilaterally  Cardiovascular: S1 and S2, regular rhythm  Gastrointestinal: soft, nontender  Extremities:  no edema  Musculoskeletal: no abnormal movements  Skin: Dressing c/d/i    Neurological exam:  HF: A x O x 3. Appropriately interactive, normal affect. Speech fluent in Cypriot, no Aphasia or paraphasic errors. Naming /repetition intact   CN: MARGARET, EOMI, VFF, facial sensation normal, no NLFD, tongue midline, Palate moves equally, SCM equal bilaterally  Motor: No pronator drift, Strength 5/5 in all 4 ext, normal bulk and tone, no tremor, rigidity or bradykinesia.    Sens: Intact to light touch  Reflexes: Symmetric and normal, downgoing toes b/l, no clonus   Coord:  No FNFA, dysmetria, KRISTI intact   Gait/Balance: steady without ataxia      LABS:                         9.6    7.87  )-----------( 252      ( 02 Oct 2023 07:48 )             28.6     10-02    144  |  109<H>  |  8   ----------------------------<  127<H>  3.6   |  32<H>  |  0.50    Ca    8.3<L>      02 Oct 2023 07:48  Phos  2.5     10-02

## 2023-10-03 NOTE — PROGRESS NOTE ADULT - ASSESSMENT
67 yo F hx of spinal stenosis and foraminal stenosis with severe radiculopathy scheduled for surgery 10/23 with Dr. Johnson but patient states pain is intolerable. Plan to admit to Dr. Johnson Neurousurgery and book for surgery in the coming days. She will need medical clearance prior to surgery.     POD#4 s/p L3-L5 decompression and fusion, L4-L5 TLIF    PLAN-  Advance activity as tolerated  Pain controlled on oral pain meds  LSO when out of bed  OOB with PT   voiding without umaña  Hospitalist following  encouraged to continue to use Incentive spirometer   Consistent carbohydrate diet and sliding scale insulin as needed   Bowel regimen  Lovenox, SCDs DVT ppx  Plan for d/c home in the AM     Discussed with Dr. Johnson this am No

## 2023-10-04 ENCOUNTER — TRANSCRIPTION ENCOUNTER (OUTPATIENT)
Age: 68
End: 2023-10-04

## 2023-10-04 VITALS
SYSTOLIC BLOOD PRESSURE: 120 MMHG | HEART RATE: 82 BPM | TEMPERATURE: 100 F | OXYGEN SATURATION: 95 % | RESPIRATION RATE: 18 BRPM | DIASTOLIC BLOOD PRESSURE: 59 MMHG

## 2023-10-04 PROCEDURE — 99232 SBSQ HOSP IP/OBS MODERATE 35: CPT

## 2023-10-04 RX ORDER — ACETAMINOPHEN 500 MG
2 TABLET ORAL
Qty: 0 | Refills: 0 | DISCHARGE
Start: 2023-10-04

## 2023-10-04 RX ORDER — OXYCODONE HYDROCHLORIDE 5 MG/1
1 TABLET ORAL
Qty: 30 | Refills: 0
Start: 2023-10-04

## 2023-10-04 RX ORDER — METHOCARBAMOL 500 MG/1
1 TABLET, FILM COATED ORAL
Refills: 0 | DISCHARGE

## 2023-10-04 RX ORDER — CYCLOBENZAPRINE HYDROCHLORIDE 10 MG/1
1 TABLET, FILM COATED ORAL
Qty: 42 | Refills: 0
Start: 2023-10-04 | End: 2023-10-17

## 2023-10-04 RX ORDER — POLYETHYLENE GLYCOL 3350 17 G/17G
17 POWDER, FOR SOLUTION ORAL
Qty: 0 | Refills: 0 | DISCHARGE
Start: 2023-10-04

## 2023-10-04 RX ORDER — SENNA PLUS 8.6 MG/1
2 TABLET ORAL
Qty: 0 | Refills: 0 | DISCHARGE
Start: 2023-10-04

## 2023-10-04 RX ADMIN — Medication 650 MILLIGRAM(S): at 00:54

## 2023-10-04 RX ADMIN — GABAPENTIN 600 MILLIGRAM(S): 400 CAPSULE ORAL at 05:56

## 2023-10-04 RX ADMIN — OXYCODONE HYDROCHLORIDE 10 MILLIGRAM(S): 5 TABLET ORAL at 05:56

## 2023-10-04 RX ADMIN — ENOXAPARIN SODIUM 40 MILLIGRAM(S): 100 INJECTION SUBCUTANEOUS at 14:44

## 2023-10-04 RX ADMIN — OXYCODONE HYDROCHLORIDE 10 MILLIGRAM(S): 5 TABLET ORAL at 06:37

## 2023-10-04 RX ADMIN — GABAPENTIN 600 MILLIGRAM(S): 400 CAPSULE ORAL at 14:44

## 2023-10-04 RX ADMIN — Medication 650 MILLIGRAM(S): at 01:20

## 2023-10-04 RX ADMIN — LOSARTAN POTASSIUM 100 MILLIGRAM(S): 100 TABLET, FILM COATED ORAL at 10:04

## 2023-10-04 RX ADMIN — Medication 88 MICROGRAM(S): at 05:56

## 2023-10-04 RX ADMIN — POLYETHYLENE GLYCOL 3350 17 GRAM(S): 17 POWDER, FOR SOLUTION ORAL at 10:04

## 2023-10-04 RX ADMIN — Medication 25 MILLIGRAM(S): at 10:05

## 2023-10-04 RX ADMIN — Medication 3 MILLIGRAM(S): at 10:04

## 2023-10-04 RX ADMIN — PANTOPRAZOLE SODIUM 40 MILLIGRAM(S): 20 TABLET, DELAYED RELEASE ORAL at 05:56

## 2023-10-04 NOTE — PROGRESS NOTE ADULT - PROVIDER SPECIALTY LIST ADULT
Hospitalist
Neurosurgery
Neurosurgery
Hospitalist
Neurosurgery
Neurosurgery
Orthopedics
Neurosurgery

## 2023-10-04 NOTE — DISCHARGE NOTE PROVIDER - CARE PROVIDER_API CALL
[facility]  Spine Surgery  [address]  [place] [address]  Phone: [phone]  Fax: [phone]  Follow Up Time: 2 weeks

## 2023-10-04 NOTE — DISCHARGE NOTE NURSING/CASE MANAGEMENT/SOCIAL WORK - PATIENT PORTAL LINK FT
You can access the FollowMyHealth Patient Portal offered by [facility] by registering at the following website: [email or web address]. By joining [facility]’s FollowMyHealth portal, you will also be able to view your health information using other applications (apps) compatible with our system.

## 2023-10-04 NOTE — DISCHARGE NOTE NURSING/CASE MANAGEMENT/SOCIAL WORK - NSDCDMETYPESERV_GEN_ALL_CORE_FT
RW [de-identified] : Physical Exam:\par General: Well appearing, no acute distress, A&O\par Neurologic: A&Ox3, No focal deficits\par Head: NCAT without abrasions, lacerations, or ecchymosis to head, face, or scalp\par Eyes: No scleral icterus, no gross abnormalities\par Respiratory: Equal chest wall expansion bilaterally, no accessory muscle use\par Lymphatic: No lymphadenopathy palpated\par Skin: Warm and dry\par Psychiatric: Normal mood and affect\par Right Shoulder\par ·	Inspection/Palpation: no tenderness, swelling , deformity at the SC joint with anterior subluxated clavicle which is chornic\par ·	Range of Motion: no crepitus with ROM; Active FF 90; ER at side 35; IR to back pocket; Passive FF same\par ·	Strength: forward elevation in scapular plane [4/5], internal rotation [4/5], external rotation [4/5], adduction [4/5] and abduction [4/5]\par ·	Stability: no joint instability on provocative testing\par ·	Tests: Crooks test positive, Neer positive, positive drop arm test secondary to pain, bear hug test positive, Napolean sign negative, cross arm adduction negative, lift off sign positive, hornblowers sign negative, speeds test negative, Yergason's test negative, no bicipital groove tenderness, Winter's Active Compression test negative, whipple test positive, bicep's load II test negative\par \par Left Shoulder\par ·	Inspection/Palpation: no tenderness, swelling or deformities\par ·	Range of Motion: full and painless in all planes, no crepitus\par ·	Strength: forward elevation in scapular plane 5/5, internal rotation 5/5, external rotation 5/5, adduction 5/5 and abduction 5/5\par ·	Stability: no joint instability on provocative testing\par Tests: Crooks test negative, Neer sign negative, negative drop arm test secondary to pain, bear hug test negative, Napolean sign negative, cross arm adduction negative, lift off sign positive, hornblowers sign negative, speeds test negative, Yergason's test negative, no bicipital groove tenderness, Winter's Active Compression test negative [de-identified] :  4 views of the right shoulder were performed today and available for me to review. They demonstrate no fracture or dislocation. [No] glenohumeral degenerative changes noted. [No] AC joint degenerative changes noted. No gross deformities noted.

## 2023-10-04 NOTE — DISCHARGE NOTE PROVIDER - NSDCCPCAREPLAN_GEN_ALL_CORE_FT
PRINCIPAL DISCHARGE DIAGNOSIS  Diagnosis: Spinal stenosis of lumbar region with radiculopathy  Assessment and Plan of Treatment: s/p TLIF L4-5 with decompression L3-L5  Advance diet and activity as tolerated   Must wear brace when up and out of bed   Patient may shower, wash incision with soap and water and pat dry, do not submerge incision under water- no bath tubs, hot tubs, or swimming pools.   Take pain medication as needed      SECONDARY DISCHARGE DIAGNOSES  Diagnosis: HTN (hypertension)  Assessment and Plan of Treatment: Continue home medications and follow up with primary care physician    Diagnosis: HLD (hyperlipidemia)  Assessment and Plan of Treatment: Continue home medications and follow up with primary care physician    Diagnosis: Autoimmune hepatitis  Assessment and Plan of Treatment: Continue home medications and follow up with primary care physician    Diagnosis: Hypothyroid  Assessment and Plan of Treatment: Continue home medications and follow up with primary care physician

## 2023-10-04 NOTE — DISCHARGE NOTE PROVIDER - HOSPITAL COURSE
67 yo F hx of back pain, hypothyroidism, autoimmune hepatitis, HLD, DM Type II, HTN, degenerative disc disease and spinal stenosis is presenting with intolerable bilateral lower extremity pain and back pain x months. She was recently seen in the ED in September for similar symptoms but was discharged home with pain meds and instructed to follow up at her schedule appointment outpatient with Dr. Johnson. Her MRI at the time revealed multilevel degenerative disc disease, high grade stenosis at L5-S1 and marked foraminal stenosis at L4-L5. She is scheduled for surgery on 10/23 with Dr. Johnson but she states she cannot wait that long because the pain has become unbearable. She has been taking Gabapentin, Flexeril and Naproxen at home without relief. The pain is not better or worse with change in position. She states it is constant and wakes her up at night. She has no urinary retention or fecal incontinence.      9/26- Pt seen and examined on 2N, no events overnight, states she didn't sleep well, still c/o back pain and still c/o pain in her anterior thighs, surgery is scheduled for this coming Friday.     9/27- Pt seen and examined, no events overnight continued back pain, worse at night, hospitalist following for medical clearance, surgery scheduled for this Friday, will need Pre-op labs on Thursday 9/28- Pt seen and examined, no events overnight, plan for OR tomorrow, medical clearance is on the chart     9/29- OR for L3-L5 decompression and fusion, L4-L5 TLIF    9/30- POD#1 Patient seen and examined with Dr. Johnson, aide at bedside to translate. She reports pain across her lower back into her hips b/l. She denies pain radiating down the legs, no numb/tingling + PCA, + umaña    10/1- POD#2 Patient seen and examined with aide to translate. Sitting up in chair, complains of incisional pain across lower back into hips, occasionally front of her thighs. She denies numb/tingling, weakness.     10/2 POD#3 patient seen and examined earlier today. Patient ambulating in room without assist. Patient notes only mild incisional pain    10/3-- POD #4, Pt seen and examined, still c/o incisional back pain, was able to ambulate with assistance    10/4-- POD #5, Pt seen and examined, no events overnight, was able to ambulate to ambulate with assistance, pain controlled with PO pain meds. Plan for d/c home. Follow up with Dr. Johnson in 2 weeks. Discharge instructions reviewed with patient and prescriptions were sent to pharmacy.     Vital Signs Last 24 Hrs  T(C): 36.8 (10-04-23 @ 04:00), Max: 37.4 (10-03-23 @ 16:09)  T(F): 98.2 (10-04-23 @ 04:00), Max: 99.3 (10-03-23 @ 16:09)  HR: 64 (10-04-23 @ 04:00) (64 - 85)  BP: 114/76 (10-04-23 @ 04:00) (113/57 - 136/89)  RR: 18 (10-04-23 @ 04:00) (17 - 18)  SpO2: 96% (10-04-23 @ 04:00) (94% - 99%)

## 2023-10-04 NOTE — PROGRESS NOTE ADULT - REASON FOR ADMISSION
LE pain
Lumbar radiculopathy
LE pain

## 2023-10-04 NOTE — DISCHARGE NOTE PROVIDER - NSDCCPTREATMENT_GEN_ALL_CORE_FT
PRINCIPAL PROCEDURE  Procedure: Laminectomy, spine, 3 levels, with decompression and fusion  Findings and Treatment:

## 2023-10-04 NOTE — DISCHARGE NOTE PROVIDER - NSDCMRMEDTOKEN_GEN_ALL_CORE_FT
acetaminophen 325 mg oral tablet: 2 tab(s) orally every 6 hours As needed Mild Pain (1 - 3)  alendronate 70 mg oral tablet: 1 tab(s) orally once a week  azaTHIOprine 50 mg oral tablet: 1 tab(s) orally 2 times a day  budesonide 3 mg oral delayed release capsule: 1 cap(s) orally once a day  calcium (as carbonate)-vitamin D 600 mg-400 intl units (10 mcg) oral tablet: 1 tab(s) orally 2 times a day  cyclobenzaprine 10 mg oral tablet: 1 tab(s) orally 3 times a day as needed for  muscle spasm MDD: 3  gabapentin 300 mg oral capsule: 2 cap(s) orally 3 times a day  levothyroxine 88 mcg (0.088 mg) oral tablet: 1 tab(s) orally once a day  losartan 100 mg oral tablet: 1 tab(s) orally once a day  metFORMIN 500 mg oral tablet, extended release: 1 tab(s) orally 2 times a day  metoprolol tartrate 25 mg oral tablet: 1 tab(s) orally once a day  oxyCODONE 5 mg oral tablet: 1 tab(s) orally every 4 hours as needed for  severe pain MDD: 6  polyethylene glycol 3350 oral powder for reconstitution: 17 gram(s) orally 2 times a day  pravastatin 40 mg oral tablet: 1 tab(s) orally once a day  senna leaf extract oral tablet: 2 tab(s) orally once a day (at bedtime)  ursodiol 300 mg oral capsule: 1 cap(s) orally 2 times a day

## 2023-10-04 NOTE — PROGRESS NOTE ADULT - SUBJECTIVE AND OBJECTIVE BOX
C/c:  back pain with radiation to b/l LE    HPI: 68F, pmh of HTN, HLD, DMII, AI hepatitis, Hypothyroidism, Spinal stenosis who presented to the ER with intractable back pain/bilateral LE pain. She has knon spinal stenosis @L5-S1 with foraminal stenosis at L4-5. She was scheduled for surgery 10/23 with Dr. Johnson, but bc of intractable pain came to the ER. She is admitted to neurosx. Hospitalist service consulted for medical clearance/comanagement.     10/3/23- up in a chair, has some pain. Brace is on  10/4/23- feels good, ambulated with PT, going home today    ROS: all 10 systems reviewed and is as above otherwise negative.     Vital Signs Last 24 Hrs  T(C): 36.7 (04 Oct 2023 07:51), Max: 37.4 (03 Oct 2023 16:09)  T(F): 98.1 (04 Oct 2023 07:51), Max: 99.3 (03 Oct 2023 16:09)  HR: 75 (04 Oct 2023 07:51) (64 - 84)  BP: 127/81 (04 Oct 2023 07:51) (113/57 - 127/81)  BP(mean): --  RR: 18 (04 Oct 2023 07:51) (18 - 18)  SpO2: 96% (04 Oct 2023 07:51) (94% - 99%)    Parameters below as of 04 Oct 2023 07:51  Patient On (Oxygen Delivery Method): room air    PHYSICAL EXAM:  GENERAL: Comfortable, no acute distress   HEAD:  Normocephalic, atraumatic  EYES: EOMI, PERRLA  HEENT: Moist mucous membranes  NECK: Supple, No JVD  NERVOUS SYSTEM:  Alert & Oriented X3, Motor Strength 5/5 B/L upper and lower extremities  CHEST/LUNG: Clear to auscultation bilaterally  HEART: Regular rate and rhythm, +2/6 systolic murmur  ABDOMEN: Soft, Nontender, Nondistended, Bowel sounds present  GENITOURINARY: no palpable bladder  EXTREMITIES:   No clubbing, cyanosis, or edema  MUSCULOSKELETAL-dressing intact.  SKIN-no rash    LABS:             NO new labs    MEDICATIONS  (STANDING):  buDESOnide    EC Capsule 3 milliGRAM(s) Oral daily  enoxaparin Injectable 40 milliGRAM(s) SubCutaneous every 24 hours  gabapentin 600 milliGRAM(s) Oral three times a day  levothyroxine 88 MICROGram(s) Oral daily  losartan 100 milliGRAM(s) Oral daily  metoprolol tartrate 25 milliGRAM(s) Oral daily  pantoprazole    Tablet 40 milliGRAM(s) Oral before breakfast  polyethylene glycol 3350 17 Gram(s) Oral two times a day  senna 2 Tablet(s) Oral at bedtime    MEDICATIONS  (PRN):  acetaminophen     Tablet .. 650 milliGRAM(s) Oral every 6 hours PRN Mild Pain (1 - 3)  acetaminophen   IVPB .. 1000 milliGRAM(s) IV Intermittent once PRN Moderate Pain (4 - 6)  bisacodyl 5 milliGRAM(s) Oral every 12 hours PRN Constipation  cyclobenzaprine 10 milliGRAM(s) Oral three times a day PRN Muscle Spasm  HYDROmorphone  Injectable 1 milliGRAM(s) IV Push every 4 hours PRN Severe Pain (7 - 10)  morphine  - Injectable 2 milliGRAM(s) IV Push every 4 hours PRN Severe Pain (7 - 10)  naloxone Injectable 0.1 milliGRAM(s) IV Push every 3 minutes PRN For ANY of the following changes in patient status:  A. RR LESS THAN 10 breaths per minute, B. Oxygen saturation LESS THAN 90%, C. Sedation score of 6  ondansetron Injectable 4 milliGRAM(s) IV Push every 6 hours PRN Nausea  ondansetron Injectable 4 milliGRAM(s) IV Push every 6 hours PRN Nausea and/or Vomiting  oxyCODONE    IR 10 milliGRAM(s) Oral every 4 hours PRN Moderate Pain (4 - 6)  oxyCODONE    IR 5 milliGRAM(s) Oral every 4 hours PRN Mild Pain (1 - 3)      Assessment and Plan:   68F, pmh as above a/w    1. Lumbar spinal stenosis with radicular pain:  -s/p lumbar lami/fusion POD#5  -pain control   -incentive spirometry  -bowel regimen.     2. Acute blood loss anemia:  -d/t surgery  -no need for transfusion at this time.     3. HTN:  -continue bb, arb    4. HLD:  -statin    5. DMII  -hgba1c 6.1-->c/w prediabetes.  -hold metformin- resume on discharge   -off ss  -dc bgms    6. Hypothyroid  -continue synthroid.     7. AI hepatitis:  -on budesonide 3mg daily (equivalent to 10mg prednisone daily)  -continue azathioprine.   -s/p periop stress dose steroids.     8. DVT px:  -per primary team/ lovenox    #Dispo- likely home today. per primary team

## 2023-10-04 NOTE — DISCHARGE NOTE PROVIDER - NSDCFUADDINST_GEN_ALL_CORE_FT
Advance diet and activity as tolerated  Avoid heavy lifting, bending or twisting  Lift nothing more than 10 lbs  Take pain medications as prescribed  Drink plenty of water to avoid constipation, take over the counter stool softeners if needed  Patient may shower, wash incision with soap and water and pat dry, do not submerge incision under water-- no bath tubs, hot tubs, or swimming pools  If you develop redness, swelling, bleeding, discharge, fever, or increased pain please call the office   If you develop chest pain or shortness of breath please go to the nearest emergency room   You may drive, but do not drive if distracted by pain or narcotic medication  Please do not hesitate to call the office with any questions or concerns

## 2023-10-16 ENCOUNTER — APPOINTMENT (OUTPATIENT)
Dept: NEUROSURGERY | Facility: CLINIC | Age: 68
End: 2023-10-16
Payer: MEDICAID

## 2023-10-16 DIAGNOSIS — M54.16 RADICULOPATHY, LUMBAR REGION: ICD-10-CM

## 2023-10-16 DIAGNOSIS — M43.16 SPONDYLOLISTHESIS, LUMBAR REGION: ICD-10-CM

## 2023-10-16 PROCEDURE — 99024 POSTOP FOLLOW-UP VISIT: CPT

## 2023-10-16 RX ORDER — OXYCODONE 5 MG/1
5 TABLET ORAL EVERY 6 HOURS
Qty: 28 | Refills: 0 | Status: ACTIVE | COMMUNITY
Start: 2023-08-30 | End: 1900-01-01

## 2023-10-17 PROBLEM — M54.16 LUMBAR RADICULOPATHY: Status: RESOLVED | Noted: 2023-08-30 | Resolved: 2023-10-17

## 2023-10-17 PROBLEM — M43.16 SPONDYLOLISTHESIS OF LUMBAR REGION: Status: RESOLVED | Noted: 2023-08-30 | Resolved: 2023-10-17

## 2023-10-18 ENCOUNTER — APPOINTMENT (OUTPATIENT)
Dept: NEUROSURGERY | Facility: CLINIC | Age: 68
End: 2023-10-18

## 2023-11-04 ENCOUNTER — EMERGENCY (EMERGENCY)
Facility: HOSPITAL | Age: 68
LOS: 0 days | Discharge: HOME | End: 2023-11-04
Attending: STUDENT IN AN ORGANIZED HEALTH CARE EDUCATION/TRAINING PROGRAM
Payer: MEDICAID

## 2023-11-04 VITALS
RESPIRATION RATE: 18 BRPM | DIASTOLIC BLOOD PRESSURE: 86 MMHG | HEART RATE: 85 BPM | SYSTOLIC BLOOD PRESSURE: 135 MMHG | HEIGHT: 64 IN | OXYGEN SATURATION: 98 % | WEIGHT: 154.98 LBS | TEMPERATURE: 99 F

## 2023-11-04 VITALS
SYSTOLIC BLOOD PRESSURE: 142 MMHG | DIASTOLIC BLOOD PRESSURE: 92 MMHG | RESPIRATION RATE: 16 BRPM | TEMPERATURE: 97 F | HEART RATE: 66 BPM | OXYGEN SATURATION: 99 %

## 2023-11-04 DIAGNOSIS — E11.9 TYPE 2 DIABETES MELLITUS WITHOUT COMPLICATIONS: ICD-10-CM

## 2023-11-04 DIAGNOSIS — Z98.42 CATARACT EXTRACTION STATUS, LEFT EYE: ICD-10-CM

## 2023-11-04 DIAGNOSIS — R50.9 FEVER, UNSPECIFIED: ICD-10-CM

## 2023-11-04 DIAGNOSIS — R30.0 DYSURIA: ICD-10-CM

## 2023-11-04 DIAGNOSIS — I10 ESSENTIAL (PRIMARY) HYPERTENSION: ICD-10-CM

## 2023-11-04 DIAGNOSIS — Z98.890 OTHER SPECIFIED POSTPROCEDURAL STATES: Chronic | ICD-10-CM

## 2023-11-04 DIAGNOSIS — R82.4 ACETONURIA: ICD-10-CM

## 2023-11-04 DIAGNOSIS — N39.0 URINARY TRACT INFECTION, SITE NOT SPECIFIED: ICD-10-CM

## 2023-11-04 DIAGNOSIS — Z86.19 PERSONAL HISTORY OF OTHER INFECTIOUS AND PARASITIC DISEASES: ICD-10-CM

## 2023-11-04 DIAGNOSIS — E03.9 HYPOTHYROIDISM, UNSPECIFIED: ICD-10-CM

## 2023-11-04 DIAGNOSIS — E88.810 METABOLIC SYNDROME: ICD-10-CM

## 2023-11-04 DIAGNOSIS — Z98.42 CATARACT EXTRACTION STATUS, LEFT EYE: Chronic | ICD-10-CM

## 2023-11-04 DIAGNOSIS — E78.00 PURE HYPERCHOLESTEROLEMIA, UNSPECIFIED: ICD-10-CM

## 2023-11-04 DIAGNOSIS — Z79.84 LONG TERM (CURRENT) USE OF ORAL HYPOGLYCEMIC DRUGS: ICD-10-CM

## 2023-11-04 DIAGNOSIS — Z87.59 PERSONAL HISTORY OF OTHER COMPLICATIONS OF PREGNANCY, CHILDBIRTH AND THE PUERPERIUM: ICD-10-CM

## 2023-11-04 DIAGNOSIS — R35.0 FREQUENCY OF MICTURITION: ICD-10-CM

## 2023-11-04 DIAGNOSIS — Z98.891 HISTORY OF UTERINE SCAR FROM PREVIOUS SURGERY: Chronic | ICD-10-CM

## 2023-11-04 LAB
ALBUMIN SERPL ELPH-MCNC: 4.1 G/DL — SIGNIFICANT CHANGE UP (ref 3.3–5)
ALBUMIN SERPL ELPH-MCNC: 4.1 G/DL — SIGNIFICANT CHANGE UP (ref 3.3–5)
ALP SERPL-CCNC: 95 U/L — SIGNIFICANT CHANGE UP (ref 40–120)
ALP SERPL-CCNC: 95 U/L — SIGNIFICANT CHANGE UP (ref 40–120)
ALT FLD-CCNC: 19 U/L — SIGNIFICANT CHANGE UP (ref 12–78)
ALT FLD-CCNC: 19 U/L — SIGNIFICANT CHANGE UP (ref 12–78)
ANION GAP SERPL CALC-SCNC: 5 MMOL/L — SIGNIFICANT CHANGE UP (ref 5–17)
ANION GAP SERPL CALC-SCNC: 5 MMOL/L — SIGNIFICANT CHANGE UP (ref 5–17)
APPEARANCE UR: CLEAR — SIGNIFICANT CHANGE UP
APPEARANCE UR: CLEAR — SIGNIFICANT CHANGE UP
AST SERPL-CCNC: 17 U/L — SIGNIFICANT CHANGE UP (ref 15–37)
AST SERPL-CCNC: 17 U/L — SIGNIFICANT CHANGE UP (ref 15–37)
BACTERIA # UR AUTO: NEGATIVE /HPF — SIGNIFICANT CHANGE UP
BACTERIA # UR AUTO: NEGATIVE /HPF — SIGNIFICANT CHANGE UP
BASOPHILS # BLD AUTO: 0.02 K/UL — SIGNIFICANT CHANGE UP (ref 0–0.2)
BASOPHILS # BLD AUTO: 0.02 K/UL — SIGNIFICANT CHANGE UP (ref 0–0.2)
BASOPHILS NFR BLD AUTO: 0.3 % — SIGNIFICANT CHANGE UP (ref 0–2)
BASOPHILS NFR BLD AUTO: 0.3 % — SIGNIFICANT CHANGE UP (ref 0–2)
BILIRUB SERPL-MCNC: 0.8 MG/DL — SIGNIFICANT CHANGE UP (ref 0.2–1.2)
BILIRUB SERPL-MCNC: 0.8 MG/DL — SIGNIFICANT CHANGE UP (ref 0.2–1.2)
BILIRUB UR-MCNC: ABNORMAL
BILIRUB UR-MCNC: ABNORMAL
BUN SERPL-MCNC: 8 MG/DL — SIGNIFICANT CHANGE UP (ref 7–23)
BUN SERPL-MCNC: 8 MG/DL — SIGNIFICANT CHANGE UP (ref 7–23)
CALCIUM SERPL-MCNC: 9.4 MG/DL — SIGNIFICANT CHANGE UP (ref 8.5–10.1)
CALCIUM SERPL-MCNC: 9.4 MG/DL — SIGNIFICANT CHANGE UP (ref 8.5–10.1)
CAST: 22 /LPF — HIGH (ref 0–4)
CAST: 22 /LPF — HIGH (ref 0–4)
CHLORIDE SERPL-SCNC: 102 MMOL/L — SIGNIFICANT CHANGE UP (ref 96–108)
CHLORIDE SERPL-SCNC: 102 MMOL/L — SIGNIFICANT CHANGE UP (ref 96–108)
CO2 SERPL-SCNC: 28 MMOL/L — SIGNIFICANT CHANGE UP (ref 22–31)
CO2 SERPL-SCNC: 28 MMOL/L — SIGNIFICANT CHANGE UP (ref 22–31)
COLOR SPEC: SIGNIFICANT CHANGE UP
COLOR SPEC: SIGNIFICANT CHANGE UP
CREAT SERPL-MCNC: 0.75 MG/DL — SIGNIFICANT CHANGE UP (ref 0.5–1.3)
CREAT SERPL-MCNC: 0.75 MG/DL — SIGNIFICANT CHANGE UP (ref 0.5–1.3)
DIFF PNL FLD: NEGATIVE — SIGNIFICANT CHANGE UP
DIFF PNL FLD: NEGATIVE — SIGNIFICANT CHANGE UP
EGFR: 86 ML/MIN/1.73M2 — SIGNIFICANT CHANGE UP
EGFR: 86 ML/MIN/1.73M2 — SIGNIFICANT CHANGE UP
EOSINOPHIL # BLD AUTO: 0.11 K/UL — SIGNIFICANT CHANGE UP (ref 0–0.5)
EOSINOPHIL # BLD AUTO: 0.11 K/UL — SIGNIFICANT CHANGE UP (ref 0–0.5)
EOSINOPHIL NFR BLD AUTO: 1.8 % — SIGNIFICANT CHANGE UP (ref 0–6)
EOSINOPHIL NFR BLD AUTO: 1.8 % — SIGNIFICANT CHANGE UP (ref 0–6)
FLUAV AG NPH QL: SIGNIFICANT CHANGE UP
FLUAV AG NPH QL: SIGNIFICANT CHANGE UP
FLUBV AG NPH QL: SIGNIFICANT CHANGE UP
FLUBV AG NPH QL: SIGNIFICANT CHANGE UP
GLUCOSE SERPL-MCNC: 132 MG/DL — HIGH (ref 70–99)
GLUCOSE SERPL-MCNC: 132 MG/DL — HIGH (ref 70–99)
GLUCOSE UR QL: NEGATIVE MG/DL — SIGNIFICANT CHANGE UP
GLUCOSE UR QL: NEGATIVE MG/DL — SIGNIFICANT CHANGE UP
HCT VFR BLD CALC: 41.1 % — SIGNIFICANT CHANGE UP (ref 34.5–45)
HCT VFR BLD CALC: 41.1 % — SIGNIFICANT CHANGE UP (ref 34.5–45)
HGB BLD-MCNC: 13.7 G/DL — SIGNIFICANT CHANGE UP (ref 11.5–15.5)
HGB BLD-MCNC: 13.7 G/DL — SIGNIFICANT CHANGE UP (ref 11.5–15.5)
IMM GRANULOCYTES NFR BLD AUTO: 0.2 % — SIGNIFICANT CHANGE UP (ref 0–0.9)
IMM GRANULOCYTES NFR BLD AUTO: 0.2 % — SIGNIFICANT CHANGE UP (ref 0–0.9)
KETONES UR-MCNC: ABNORMAL MG/DL
KETONES UR-MCNC: ABNORMAL MG/DL
LEUKOCYTE ESTERASE UR-ACNC: ABNORMAL
LEUKOCYTE ESTERASE UR-ACNC: ABNORMAL
LYMPHOCYTES # BLD AUTO: 1.32 K/UL — SIGNIFICANT CHANGE UP (ref 1–3.3)
LYMPHOCYTES # BLD AUTO: 1.32 K/UL — SIGNIFICANT CHANGE UP (ref 1–3.3)
LYMPHOCYTES # BLD AUTO: 21.6 % — SIGNIFICANT CHANGE UP (ref 13–44)
LYMPHOCYTES # BLD AUTO: 21.6 % — SIGNIFICANT CHANGE UP (ref 13–44)
MAGNESIUM SERPL-MCNC: 2.1 MG/DL — SIGNIFICANT CHANGE UP (ref 1.6–2.6)
MAGNESIUM SERPL-MCNC: 2.1 MG/DL — SIGNIFICANT CHANGE UP (ref 1.6–2.6)
MCHC RBC-ENTMCNC: 30.1 PG — SIGNIFICANT CHANGE UP (ref 27–34)
MCHC RBC-ENTMCNC: 30.1 PG — SIGNIFICANT CHANGE UP (ref 27–34)
MCHC RBC-ENTMCNC: 33.3 GM/DL — SIGNIFICANT CHANGE UP (ref 32–36)
MCHC RBC-ENTMCNC: 33.3 GM/DL — SIGNIFICANT CHANGE UP (ref 32–36)
MCV RBC AUTO: 90.3 FL — SIGNIFICANT CHANGE UP (ref 80–100)
MCV RBC AUTO: 90.3 FL — SIGNIFICANT CHANGE UP (ref 80–100)
MONOCYTES # BLD AUTO: 0.43 K/UL — SIGNIFICANT CHANGE UP (ref 0–0.9)
MONOCYTES # BLD AUTO: 0.43 K/UL — SIGNIFICANT CHANGE UP (ref 0–0.9)
MONOCYTES NFR BLD AUTO: 7 % — SIGNIFICANT CHANGE UP (ref 2–14)
MONOCYTES NFR BLD AUTO: 7 % — SIGNIFICANT CHANGE UP (ref 2–14)
NEUTROPHILS # BLD AUTO: 4.23 K/UL — SIGNIFICANT CHANGE UP (ref 1.8–7.4)
NEUTROPHILS # BLD AUTO: 4.23 K/UL — SIGNIFICANT CHANGE UP (ref 1.8–7.4)
NEUTROPHILS NFR BLD AUTO: 69.1 % — SIGNIFICANT CHANGE UP (ref 43–77)
NEUTROPHILS NFR BLD AUTO: 69.1 % — SIGNIFICANT CHANGE UP (ref 43–77)
NITRITE UR-MCNC: POSITIVE
NITRITE UR-MCNC: POSITIVE
PH UR: 6.5 — SIGNIFICANT CHANGE UP (ref 5–8)
PH UR: 6.5 — SIGNIFICANT CHANGE UP (ref 5–8)
PLATELET # BLD AUTO: 362 K/UL — SIGNIFICANT CHANGE UP (ref 150–400)
PLATELET # BLD AUTO: 362 K/UL — SIGNIFICANT CHANGE UP (ref 150–400)
POTASSIUM SERPL-MCNC: 4 MMOL/L — SIGNIFICANT CHANGE UP (ref 3.5–5.3)
POTASSIUM SERPL-MCNC: 4 MMOL/L — SIGNIFICANT CHANGE UP (ref 3.5–5.3)
POTASSIUM SERPL-SCNC: 4 MMOL/L — SIGNIFICANT CHANGE UP (ref 3.5–5.3)
POTASSIUM SERPL-SCNC: 4 MMOL/L — SIGNIFICANT CHANGE UP (ref 3.5–5.3)
PROT SERPL-MCNC: 8.6 GM/DL — HIGH (ref 6–8.3)
PROT SERPL-MCNC: 8.6 GM/DL — HIGH (ref 6–8.3)
PROT UR-MCNC: 100 MG/DL
PROT UR-MCNC: 100 MG/DL
RBC # BLD: 4.55 M/UL — SIGNIFICANT CHANGE UP (ref 3.8–5.2)
RBC # BLD: 4.55 M/UL — SIGNIFICANT CHANGE UP (ref 3.8–5.2)
RBC # FLD: 14.7 % — HIGH (ref 10.3–14.5)
RBC # FLD: 14.7 % — HIGH (ref 10.3–14.5)
RBC CASTS # UR COMP ASSIST: 6 /HPF — HIGH (ref 0–4)
RBC CASTS # UR COMP ASSIST: 6 /HPF — HIGH (ref 0–4)
RSV RNA NPH QL NAA+NON-PROBE: SIGNIFICANT CHANGE UP
RSV RNA NPH QL NAA+NON-PROBE: SIGNIFICANT CHANGE UP
SARS-COV-2 RNA SPEC QL NAA+PROBE: SIGNIFICANT CHANGE UP
SARS-COV-2 RNA SPEC QL NAA+PROBE: SIGNIFICANT CHANGE UP
SODIUM SERPL-SCNC: 135 MMOL/L — SIGNIFICANT CHANGE UP (ref 135–145)
SODIUM SERPL-SCNC: 135 MMOL/L — SIGNIFICANT CHANGE UP (ref 135–145)
SP GR SPEC: 1.02 — SIGNIFICANT CHANGE UP (ref 1–1.03)
SP GR SPEC: 1.02 — SIGNIFICANT CHANGE UP (ref 1–1.03)
SQUAMOUS # UR AUTO: 4 /HPF — SIGNIFICANT CHANGE UP (ref 0–5)
SQUAMOUS # UR AUTO: 4 /HPF — SIGNIFICANT CHANGE UP (ref 0–5)
UROBILINOGEN FLD QL: 1 MG/DL — SIGNIFICANT CHANGE UP (ref 0.2–1)
UROBILINOGEN FLD QL: 1 MG/DL — SIGNIFICANT CHANGE UP (ref 0.2–1)
WBC # BLD: 6.12 K/UL — SIGNIFICANT CHANGE UP (ref 3.8–10.5)
WBC # BLD: 6.12 K/UL — SIGNIFICANT CHANGE UP (ref 3.8–10.5)
WBC # FLD AUTO: 6.12 K/UL — SIGNIFICANT CHANGE UP (ref 3.8–10.5)
WBC # FLD AUTO: 6.12 K/UL — SIGNIFICANT CHANGE UP (ref 3.8–10.5)
WBC UR QL: 6 /HPF — HIGH (ref 0–5)
WBC UR QL: 6 /HPF — HIGH (ref 0–5)

## 2023-11-04 PROCEDURE — 83735 ASSAY OF MAGNESIUM: CPT

## 2023-11-04 PROCEDURE — 80053 COMPREHEN METABOLIC PANEL: CPT

## 2023-11-04 PROCEDURE — 99284 EMERGENCY DEPT VISIT MOD MDM: CPT | Mod: 25

## 2023-11-04 PROCEDURE — 36415 COLL VENOUS BLD VENIPUNCTURE: CPT

## 2023-11-04 PROCEDURE — 87086 URINE CULTURE/COLONY COUNT: CPT

## 2023-11-04 PROCEDURE — 96375 TX/PRO/DX INJ NEW DRUG ADDON: CPT

## 2023-11-04 PROCEDURE — 85025 COMPLETE CBC W/AUTO DIFF WBC: CPT

## 2023-11-04 PROCEDURE — 81001 URINALYSIS AUTO W/SCOPE: CPT

## 2023-11-04 PROCEDURE — 0241U: CPT

## 2023-11-04 PROCEDURE — 96374 THER/PROPH/DIAG INJ IV PUSH: CPT

## 2023-11-04 PROCEDURE — 99284 EMERGENCY DEPT VISIT MOD MDM: CPT

## 2023-11-04 RX ORDER — ACETAMINOPHEN 500 MG
1000 TABLET ORAL ONCE
Refills: 0 | Status: COMPLETED | OUTPATIENT
Start: 2023-11-04 | End: 2023-11-04

## 2023-11-04 RX ORDER — ONDANSETRON 8 MG/1
4 TABLET, FILM COATED ORAL ONCE
Refills: 0 | Status: COMPLETED | OUTPATIENT
Start: 2023-11-04 | End: 2023-11-04

## 2023-11-04 RX ORDER — CEFPODOXIME PROXETIL 100 MG
1 TABLET ORAL
Qty: 14 | Refills: 0
Start: 2023-11-04 | End: 2023-11-10

## 2023-11-04 RX ORDER — CEFTRIAXONE 500 MG/1
1000 INJECTION, POWDER, FOR SOLUTION INTRAMUSCULAR; INTRAVENOUS ONCE
Refills: 0 | Status: COMPLETED | OUTPATIENT
Start: 2023-11-04 | End: 2023-11-04

## 2023-11-04 RX ADMIN — Medication 400 MILLIGRAM(S): at 12:25

## 2023-11-04 RX ADMIN — CEFTRIAXONE 1000 MILLIGRAM(S): 500 INJECTION, POWDER, FOR SOLUTION INTRAMUSCULAR; INTRAVENOUS at 13:41

## 2023-11-04 RX ADMIN — ONDANSETRON 4 MILLIGRAM(S): 8 TABLET, FILM COATED ORAL at 12:25

## 2023-11-04 NOTE — ED ADULT NURSE NOTE - OBJECTIVE STATEMENT
Pt presents to ED c/o lower abdominal pain, burning with urination, subjective fever, body aches, headache x3-4days. Pt states she had spinal surgery [id number] ago. IV established in left arm with a 20G, labs drawn and sent, call bell in reach, warm blanket provided, bed in lowest position, side rails up x2, MD evaluation in progress. Pt presents to ED c/o lower abdominal pain, burning with urination, subjective fever, body aches, headache x3-4days. Pt states she had spinal surgery [id number] ago. IV established in right arm with a 20G, labs drawn and sent, call bell in reach, warm blanket provided, bed in lowest position, side rails up x2, MD evaluation in progress.

## 2023-11-04 NOTE — ED STATDOCS - NSFOLLOWUPINSTRUCTIONS_ED_ALL_ED_FT
Follow up with your primary care doctor for further evaluation of your symptoms and the urine infection.   Take the antibiotics as directed.   Drink plenty of water.   Return to the Emergency Department for worsening or persistent symptoms, and/or ANY NEW OR CONCERNING SYMPTOMS. If you have issues obtaining follow up, please call: [phone] or [phone]  to obtain a doctor or specialist who takes your insurance in your area.         Urinary Tract Infection, Adult  ImageA urinary tract infection (UTI) is an infection of any part of the urinary tract, which includes the kidneys, ureters, bladder, and urethra. These organs make, store, and get rid of urine in the body. UTI can be a bladder infection (cystitis) or kidney infection (pyelonephritis).    What are the causes?  This infection may be caused by fungi, viruses, or bacteria. Bacteria are the most common cause of UTIs. This condition can also be caused by repeated incomplete emptying of the bladder during urination.    What increases the risk?  This condition is more likely to develop if:    You ignore your need to urinate or hold urine for long periods of time.  You do not empty your bladder completely during urination.  You wipe back to front after urinating or having a bowel movement, if you are female.  You are uncircumcised, if you are male.  You are constipated.  You have a urinary catheter that stays in place (indwelling).  You have a weak defense (immune) system.  You have a medical condition that affects your bowels, kidneys, or bladder.  You have diabetes.  You take antibiotic medicines frequently or for long periods of time, and the antibiotics no longer work well against certain types of infections (antibiotic resistance).  You take medicines that irritate your urinary tract.  You are exposed to chemicals that irritate your urinary tract.  You are female.    What are the signs or symptoms?  Symptoms of this condition include:    Fever.  Frequent urination or passing small amounts of urine frequently.  Needing to urinate urgently.  Pain or burning with urination.  Urine that smells bad or unusual.  Cloudy urine.  Pain in the lower abdomen or back.  Trouble urinating.  Blood in the urine.  Vomiting or being less hungry than normal.  Diarrhea or abdominal pain.  Vaginal discharge, if you are female.    How is this diagnosed?  This condition is diagnosed with a medical history and physical exam. You will also need to provide a urine sample to test your urine. Other tests may be done, including:    Blood tests.  Sexually transmitted disease (STD) testing.    If you have had more than one UTI, a cystoscopy or imaging studies may be done to determine the cause of the infections.    How is this treated?  Treatment for this condition often includes a combination of two or more of the following:    Antibiotic medicine.  Other medicines to treat less common causes of UTI.  Over-the-counter medicines to treat pain.  Drinking enough water to stay hydrated.    Follow these instructions at home:  Take over-the-counter and prescription medicines only as told by your health care provider.  If you were prescribed an antibiotic, take it as told by your health care provider. Do not stop taking the antibiotic even if you start to feel better.  Avoid alcohol, caffeine, tea, and carbonated beverages. They can irritate your bladder.  Drink enough fluid to keep your urine clear or pale yellow.  Keep all follow-up visits as told by your health care provider. This is important.  ImageMake sure to:    Empty your bladder often and completely. Do not hold urine for long periods of time.  Empty your bladder before and after sex.  Wipe from front to back after a bowel movement if you are female. Use each tissue one time when you wipe.    Contact a health care provider if:  You have back pain.  You have a fever.  You feel nauseous or vomit.  Your symptoms do not get better after 3 days.  Your symptoms go away and then return.  Get help right away if:  You have severe back pain or lower abdominal pain.  You are vomiting and cannot keep down any medicines or water.  This information is not intended to replace advice given to you by your health care provider. Make sure you discuss any questions you have with your health care provider.

## 2023-11-04 NOTE — ED STATDOCS - PHYSICAL EXAMINATION
Gen: NAD, AOx3, able to make needs known, non-toxic  Head: NCAT  HEENT: EOMI, oral mucosa moist, normal conjunctiva  Lung: no respiratory distress, CTAB, no wheezes/rhonchi/rales B/L, speaking in full sentences  CV: RRR, no murmurs  Abd: non distended, soft, nontender, no guarding, no CVA tenderness  MSK: no visible deformities  Neuro: Appears non focal, back brace in place  Skin: Warm, well perfused  Psych: normal affect

## 2023-11-04 NOTE — ED STATDOCS - ATTENDING APP SHARED VISIT CONTRIBUTION OF CARE
Attending Alesha: I performed a history and physical exam of the patient and discussed their management with the GLORIA. I have reviewed the GLORIA note and agree with the documented findings and plan of care, except as noted. This was a shared visit with an GLORIA. I reviewed and verified the documentation and independently performed my own history/exam/medical decision making. My medical decision making and observations are found above. Please refer to any progress notes for updates on clinical course. My notes supersedes the above ACP note in case of discrepancy

## 2023-11-04 NOTE — ED STATDOCS - PATIENT PORTAL LINK FT
You can access the FollowMyHealth Patient Portal offered by [facility] by registering at the following website: [email or web address]. By joining [facility]’s FollowMyHealth portal, you will also be able to view your health information using other applications (apps) compatible with our system.

## 2023-11-04 NOTE — ED ADULT NURSE NOTE - NSFALLRISKINTERV_ED_ALL_ED

## 2023-11-04 NOTE — ED STATDOCS - CLINICAL SUMMARY MEDICAL DECISION MAKING FREE TEXT BOX
68y female w/ hypothyroid, HTN, HLD, DM, metabolic syndrome presents to the ED c/o urinary symptoms, lower abdominal pain, fever, nausea, and congestion. Overall well appearing and in NAD. Will evaluate for UTI/pyelo. No CVAT or h/o kidney stone. No suspicion for renal polyps. Check for URI viral., Plan for labs meds and will reassess the need for additional interventions as clinically warranted. Refer to any progress notes for updates on clinical course and as a continuation of this MDM. 68y female w/ hypothyroid, HTN, HLD, DM, metabolic syndrome presents to the ED c/o urinary symptoms, lower abdominal pain, fever, nausea, and congestion. Overall well appearing and in NAD. Will evaluate for UTI/pyelo. No CVAT or h/o kidney stone. No suspicion for renal stone. Check for viral URI. Plan for labs, meds. Will reassess the need for additional interventions as clinically warranted. Refer to any progress notes for updates on clinical course and as a continuation of this MDM.

## 2023-11-04 NOTE — ED STATDOCS - NS_ ATTENDINGSCRIBEDETAILS _ED_A_ED_FT
I, Ulises Eduardo DO,  performed the initial face to face bedside interview with this patient regarding history of present illness, review of symptoms and relevant past medical, social and family history.  I completed an independent physical examination.  I was the initial provider who evaluated this patient.   I personally saw the patient and performed a substantive portion of the visit including all aspects of the medical decision making.  I have signed out the follow up of any pending tests (i.e. labs, radiological studies) to the GLORIA.  I have communicated the patient’s plan of care and disposition with the GLORIA.  The history, relevant review of systems, past medical and surgical history, medical decision making, and physical examination was documented by the scribe in my presence and I attest to the accuracy of the documentation.

## 2023-11-04 NOTE — ED STATDOCS - CARE PLAN
43 y/o F PMhx dysautonomia, anxiety/depression, POTS recently hospitalized for chronic diarrhea and abd pain s/p EGD/ Colonoscopy w/ unremarkable biopsy results who presented w/ worsening diarrhea and sore throat. SARS-COV-2 PCR positive s/p 2 doses of Paxlovid after which she reports worsening tremors, n/v, poor PO intake and diarrhea.     COVID infection, hyponatremia, diarrhea  SARS-CoV-2 PCR positive  afebrile, symptoms- sore throat, nonproductive cough  trend inflammatory markers  monitor SpO2  no indication for steroids as patient not hypoxic  patient without high risk factors other than depression,   remdesivir x 3 days, completed  c/w supportive care  IVF for hyponatremia: now resolved  ptn requested UP dopplers: done: NO DVT!  DC home today!     45 y/o F PMhx dysautonomia, anxiety/depression, POTS recently hospitalized for chronic diarrhea and abd pain s/p EGD/ Colonoscopy w/ unremarkable biopsy results who presented w/ worsening diarrhea and sore throat. SARS-COV-2 PCR positive s/p 2 doses of Paxlovid after which she reports worsening tremors, n/v, poor PO intake and diarrhea.     COVID infection, hyponatremia, diarrhea  SARS-CoV-2 PCR positive  afebrile, symptoms- sore throat, nonproductive cough  trend inflammatory markers  monitor SpO2  no indication for steroids as patient not hypoxic  patient without high risk factors other than depression,   remdesivir x 3 days  c/w supportive care  IVF for hyponatremia: now resolved     on room air  afebrile  denies complaints at present    chlorhexidine 2% Cloths 1 Application(s) Topical daily  dextrose 5%. 1000 milliLiter(s) IV Continuous <Continuous>  dicyclomine 10 milliGRAM(s) Oral three times a day before meals  diltiazem    milliGRAM(s) Oral daily  ferrous    sulfate 325 milliGRAM(s) Oral daily  lactated ringers. 1000 milliLiter(s) IV Continuous <Continuous>  LORazepam     Tablet 0.5 milliGRAM(s) Oral every 3 hours PRN  montelukast 10 milliGRAM(s) Oral at bedtime  remdesivir  IVPB   IV Intermittent   remdesivir  IVPB 200 milliGRAM(s) IV Intermittent every 24 hours  remdesivir  IVPB 100 milliGRAM(s) IV Intermittent every 24 hours      VITAL:  T(C): , Max: 37.9 (09-30-22 @ 17:01)  T(F): , Max: 100.3 (09-30-22 @ 17:01)  HR: 104 (10-01-22 @ 12:42)  BP: 108/76 (10-01-22 @ 12:42)  BP(mean): 3 (09-30-22 @ 21:27)  RR: 18 (10-01-22 @ 12:42)  SpO2: 98% (10-01-22 @ 12:42)  Wt(kg): --    09-30-22 @ 07:01  -  10-01-22 @ 07:00  --------------------------------------------------------  IN: 0 mL / OUT: 900 mL / NET: -900 mL        PHYSICAL EXAM:  Constitutional: cachectic/wasted, anxious  HEENT: NCAT, DMM  Neck: Supple, No JVD  Respiratory: CTA-b/l  Cardiovascular: RRR s1s2, no m/r/g  Gastrointestinal: BS+, soft, NT/ND  Extremities: No peripheral edema b/l  Neurological: no focal deficits; strength grossly intact  Back: no CVAT b/l  Skin: No rashes, no nevi    LABS:                          12.7   4.84  )-----------( 82       ( 01 Oct 2022 07:30 )             37.1     Na(135)/K(3.6)/Cl(103)/HCO3(18)/BUN(4)/Cr(0.81)Glu(92)/Ca(8.4)/Mg(2.2)/PO4(3.4)    10-01 @ 07:26  Na(135)/K(3.6)/Cl(101)/HCO3(19)/BUN(<4)/Cr(0.60)Glu(95)/Ca(9.1)/Mg(2.7)/PO4(4.5)    09-30 @ 15:57  Na(126)/K(3.9)/Cl(94)/HCO3(18)/BUN(<4)/Cr(0.60)Glu(115)/Ca(8.7)/Mg(--)/PO4(--)    09-30 @ 07:36  Na(120)/K(3.4)/Cl(86)/HCO3(19)/BUN(<4)/Cr(0.63)Glu(113)/Ca(9.0)/Mg(1.2)/PO4(1.1)    09-30 @ 05:45      Sodium, Random Urine: 21 mmol/L (09-30 @ 12:22)  Creatinine, Random Urine: 11 mg/dL (09-30 @ 12:22)  Protein/Creatinine Ratio Calculation: Unable to calculate (09-30 @ 12:22)  Osmolality, Random Urine: 73 mos/kg (09-30 @ 12:22)  Potassium, Random Urine: 6 mmol/L (09-30 @ 12:22)        ASSESSMENT/PLAN    ASSESSMENT:  (1)Hyponatremia - due to hypovolemia/poor osmotic intake relative to fluid intake - improving rapidly, s/p boluses of isotonic IVF. At mild but existent risk for osmotic demyelination syndrome; Na+ resolving/ resolved now  (2)Hypomagnesemia - due to poor intake/GI losses -  resolving s/p repletion    (3)Hypophosphatemia - due to poor intake/GI losses - resolving s/p repletion    RECOMMEND:  (2)BMP+Mg+PO4 daily      (a)if Mg is 1.5 or lower, can readminister IV MgSO4, 2gm x 1 at that time      (b)if PO4 is 1.5 or lower, can at that point administer potassium phosphate 15mmol x 1 (or sodium phosphate 15mmol instead, if K is >4.5)      (c)if Na+ at that point is 125 or lower, can administer LR, 50cc/h      (d)if Na+ at that point is 126-130, can give D5_1/2NS @50cc/h      (e)if Na+ at that point is 131 or higher, would give DDAVP 0.1mg po x 1 and would give D5W 50cc/h x 5h      Samuel Butt, NP-BC  [facility]  [phone]   43 y/o F PMhx dysautonomia, anxiety/depression, POTS recently hospitalized for chronic diarrhea and abd pain s/p EGD/ Colonoscopy w/ unremarkable biopsy results who presented w/ worsening diarrhea and sore throat. SARS-COV-2 PCR positive s/p 2 doses of Paxlovid after which she reports worsening tremors, n/v, poor PO intake and diarrhea.     COVID infection, hyponatremia, diarrhea  SARS-CoV-2 PCR positive  afebrile, symptoms- sore throat, nonproductive cough  comfortable and saturating well on RA  patient without high risk factors other than depression  Started on remdesivir on 10/1 x3d - last day today   - feels nausea, diarrhea and flushing/itching after is d/t remdesivir, discussed discontinuing it but pt states she wants to "push through" with the last dose because her father told her to, says she feels better with fluids after  IVFs per primary/Nephrology teams  trend inflammatory markers  monitor SpO2  no indication for steroids as patient not hypoxic  c/w supportive care  isolation per infection control policy   R lateral breast rash ?dermatitis vs folliculitis, will add mupirocin to area  d/w pt, answered questions to the best of my ability  management of hyponatremia per nephrology  Rest of care per primary team 43 y/o F PMhx dysautonomia, anxiety/depression, POTS recently hospitalized for chronic diarrhea and abd pain s/p EGD/ Colonoscopy w/ unremarkable biopsy results who presented w/ worsening diarrhea and sore throat. SARS-COV-2 PCR positive s/p 2 doses of Paxlovid after which she reports worsening tremors, n/v, poor PO intake and diarrhea.     COVID infection, hyponatremia, diarrhea  SARS-CoV-2 PCR positive  afebrile, symptoms- sore throat, nonproductive cough  trend inflammatory markers  monitor SpO2  no indication for steroids as patient not hypoxic  patient without high risk factors other than depression,   remdesivir x 3 days  c/w supportive care  IVF for hyponatremia: now resolved  DC home in am     45 y/o F PMhx dysautonomia, anxiety/depression, POTS recently hospitalized for chronic diarrhea and abd pain s/p EGD/ Colonoscopy w/ unremarkable biopsy results who presented w/ worsening diarrhea and sore throat. SARS-COV-2 PCR positive s/p 2 doses of Paxlovid after which she reports worsening tremors, n/v, poor PO intake and diarrhea.     COVID-19 infection, hyponatremia, diarrhea  SARS-CoV-2 PCR positive   afebrile, symptoms- sore throat, nonproductive cough   comfortable and saturating well on RA   patient without high risk factors other than depression   Started on remdesivir on 10/1 x3d - completed 10/3   IVFs per primary/Nephrology teams   no indication for steroids as patient not hypoxic   c/w supportive care, monitor SpO2   isolation per infection control policy     R lateral breast rash ?dermatitis vs folliculitis,  possible shingles but lesions not vesicular and not following specific dermatomal region, no pain, burning or tingling sensations   continue on mupirocin BID to area   discussed starting on empiric valtrex for possible shingles -- pt states she does not want to start it here, says her father has prescribed it for her and will be picking it up and she will take it after going home  Hyponatremia resolved, nephrology following  Rest of care per primary team  D/w pt, answered questions to the best of my ability  CRESENCIO Caraballo at bedside during visit  Discharge planning per primary team 45 y/o F PMhx dysautonomia, anxiety/depression, POTS recently hospitalized for chronic diarrhea and abd pain s/p EGD/ Colonoscopy w/ unremarkable biopsy results who presented w/ worsening diarrhea and sore throat. SARS-COV-2 PCR positive s/p 2 doses of Paxlovid after which she reports worsening tremors, n/v, poor PO intake and diarrhea.     COVID infection, hyponatremia, diarrhea  SARS-CoV-2 PCR positive  afebrile, symptoms- sore throat, nonproductive cough  trend inflammatory markers  monitor SpO2  no indication for steroids as patient not hypoxic  patient without high risk factors other than depression,   remdesivir x 3 days  c/w supportive care  IVF for hyponatremia: now resolved     1 Principal Discharge DX:	Acute UTI

## 2023-11-04 NOTE — ED STATDOCS - OBJECTIVE STATEMENT
Pt is a 68y Greek-speaking female with a PMH of hypothyroid, HTN, HLD, DM, metabolic syndrome presents to the ED c/o urinary symptoms. History via son. Pt's son states she has been with a urinary symptoms for 3 days and states her symptoms are worsening. Endorses dry heaving, dysuria, urinary frequency, subjective fever, body aches, lower abdominal pain and LBP. has been unable to sleep 2/2 pain. Denies h/o kidney stones. No meds taken PTA. NKA.

## 2023-11-04 NOTE — ED STATDOCS - PROGRESS NOTE DETAILS
Zygomaticofacial Flap Text: Given the location of the defect, shape of the defect and the proximity to free margins a zygomaticofacial flap was deemed most appropriate for repair.  Using a sterile surgical marker, the appropriate flap was drawn incorporating the defect and placing the expected incisions within the relaxed skin tension lines where possible. The area thus outlined was incised deep to adipose tissue with a #15 scalpel blade with preservation of a vascular pedicle.  The skin margins were undermined to an appropriate distance in all directions utilizing iris scissors.  The flap was then placed into the defect and anchored with interrupted buried subcutaneous sutures. 69 yo female with a PMH of hypothyroid, htn, hld, dm, recent back surgery with Dr Johnson last month for laminectomy, fusion/decompression was BIBson for 3 days of burning sensation when urinated, frequency, sub fever at home, myalgia, Per son, pt had UTIs in the past (last episode was approx 3 months ago and usually goes home with oral abx.   Will check labs, UA, meds and reeval. -Doc Medina PA-C UA with +nitrites and leuko. No blood leukocytosis. Discussed results with pt and son. Will give dose of IV abx and d/c home on oral medication. -Doc Medina PA-C

## 2023-11-13 ENCOUNTER — OUTPATIENT (OUTPATIENT)
Dept: OUTPATIENT SERVICES | Facility: HOSPITAL | Age: 68
LOS: 1 days | End: 2023-11-13
Payer: MEDICAID

## 2023-11-13 ENCOUNTER — APPOINTMENT (OUTPATIENT)
Dept: RADIOLOGY | Facility: CLINIC | Age: 68
End: 2023-11-13
Payer: MEDICAID

## 2023-11-13 DIAGNOSIS — Z98.890 OTHER SPECIFIED POSTPROCEDURAL STATES: Chronic | ICD-10-CM

## 2023-11-13 DIAGNOSIS — Z98.1 ARTHRODESIS STATUS: ICD-10-CM

## 2023-11-13 DIAGNOSIS — Z98.42 CATARACT EXTRACTION STATUS, LEFT EYE: Chronic | ICD-10-CM

## 2023-11-13 PROCEDURE — 72110 X-RAY EXAM L-2 SPINE 4/>VWS: CPT

## 2023-11-13 PROCEDURE — 72110 X-RAY EXAM L-2 SPINE 4/>VWS: CPT | Mod: 26

## 2023-11-14 ENCOUNTER — RESULT REVIEW (OUTPATIENT)
Age: 68
End: 2023-11-14

## 2023-11-14 ENCOUNTER — APPOINTMENT (OUTPATIENT)
Dept: NEUROSURGERY | Facility: CLINIC | Age: 68
End: 2023-11-14
Payer: MEDICAID

## 2023-11-14 VITALS
OXYGEN SATURATION: 96 % | HEIGHT: 58 IN | SYSTOLIC BLOOD PRESSURE: 142 MMHG | WEIGHT: 155 LBS | DIASTOLIC BLOOD PRESSURE: 89 MMHG | BODY MASS INDEX: 32.54 KG/M2 | HEART RATE: 82 BPM

## 2023-11-14 DIAGNOSIS — M79.662 PAIN IN RIGHT LOWER LEG: ICD-10-CM

## 2023-11-14 DIAGNOSIS — M79.661 PAIN IN RIGHT LOWER LEG: ICD-10-CM

## 2023-11-14 PROCEDURE — 99024 POSTOP FOLLOW-UP VISIT: CPT

## 2023-11-14 RX ORDER — GABAPENTIN 300 MG/1
300 CAPSULE ORAL 3 TIMES DAILY
Qty: 180 | Refills: 3 | Status: ACTIVE | COMMUNITY
Start: 2023-09-12 | End: 1900-01-01

## 2023-11-15 ENCOUNTER — OUTPATIENT (OUTPATIENT)
Dept: OUTPATIENT SERVICES | Facility: HOSPITAL | Age: 68
LOS: 1 days | End: 2023-11-15
Payer: MEDICAID

## 2023-11-15 ENCOUNTER — APPOINTMENT (OUTPATIENT)
Dept: ULTRASOUND IMAGING | Facility: CLINIC | Age: 68
End: 2023-11-15
Payer: MEDICAID

## 2023-11-15 DIAGNOSIS — Z98.890 OTHER SPECIFIED POSTPROCEDURAL STATES: Chronic | ICD-10-CM

## 2023-11-15 DIAGNOSIS — M79.661 PAIN IN RIGHT LOWER LEG: ICD-10-CM

## 2023-11-15 PROCEDURE — 93970 EXTREMITY STUDY: CPT | Mod: 26

## 2023-11-15 PROCEDURE — 93970 EXTREMITY STUDY: CPT

## 2023-11-16 ENCOUNTER — NON-APPOINTMENT (OUTPATIENT)
Age: 68
End: 2023-11-16

## 2023-12-14 NOTE — PATIENT PROFILE ADULT - FUNCTIONAL ASSESSMENT - BASIC MOBILITY 6.
[address] [facility] coding opportunities       Chart reviewed, no opportunity found: [address]        Patients Insurance     Medicare Insurance: [facility] Advantage
4 = No assist / stand by assistance

## 2023-12-19 ENCOUNTER — APPOINTMENT (OUTPATIENT)
Dept: NEUROSURGERY | Facility: CLINIC | Age: 68
End: 2023-12-19
Payer: MEDICAID

## 2023-12-19 VITALS — DIASTOLIC BLOOD PRESSURE: 89 MMHG | OXYGEN SATURATION: 96 % | HEART RATE: 64 BPM | SYSTOLIC BLOOD PRESSURE: 146 MMHG

## 2023-12-19 PROCEDURE — 99024 POSTOP FOLLOW-UP VISIT: CPT

## 2023-12-21 NOTE — CONSULT LETTER
[Dear  ___] : Dear  [unfilled], [Courtesy Letter:] : I had the pleasure of seeing your patient, [unfilled], in my office today. [Sincerely,] : Sincerely, [FreeTextEntry2] : Nehla Junior MD [address], [place] [address]   [FreeTextEntry1] : Mrs. Rahman is a very pleasant 68-year-old female patient who was seen in our office in follow-up.  The patient was accompanied by her son at today's visit.  The patient is approximately 3 months out of the multilevel lumbar decompression and fusion.  Translation was provided through  service at today's visit.  I am happy to report that the patient is currently doing well following her surgical intervention.  The patient's preoperative severe bilateral leg pain remains resolved.  The patient is now complaining of mild right-sided knee pain and numbness in the sole of the left foot.  The symptoms are mild and the patient does not wish to investigate these issues further.  The patient is working with physical therapy.  The patient remains on gabapentin 600 mg 3 times a day.  On examination, the patient is alert, oriented, and compliant with the exam.  The patient's incision is clean, dry, and intact.  The patient ambulates slowly with a wide-based gait but this is a significant improvement compared to her preoperative state.  The patient demonstrates full strength in the lower extremities appropriate for her age.  I have no new imaging to review today.  Taken together, I am gratified to see the patient doing well following her surgical intervention.  At this time, I have recommended trying to wean herself off the gabapentin.  I recommended taking 300 mg of gabapentin 3 times a day for the next week then twice a day the week after then once a day the week after.  This gabapentin taper was provided to the patient through the  service as well as to the patient's son to help monitor these medications.  The patient will be following up with us in approximately 3 months for a final postoperative visit. [FreeTextEntry3] : Shane Johnson MD, PhD, CS, FAANS Attending Neurosurgeon  of Neurosurgery [facility] at [facility] at [address], [place] [address] Office: [phone] Fax: [phone]

## 2024-01-03 NOTE — ED ADULT NURSE NOTE - SUICIDE SCREENING QUESTION 3
From: Marcelina Collins  To: Dr. Francisco Javier Aguilar  Sent: 1/3/2024 11:06 AM EST  Subject: Mom needs Losartan refill    Hi Dr. Aguilar,  Mom needs a refill sent in on her Losartan. That’s the only refill needed.   Thanks,  Rip  
No

## 2024-01-19 NOTE — ED ADULT NURSE NOTE - NSFALLRSKASSESSDT_ED_ALL_ED
Past Medical History:        Diagnosis Date   • Anemia    • Anxiety    • Arthritis    • Asthma     use inhaler as needed   • Chest pain 2015    went to [facility] one doctor stated it was due to murmur and another doctor stated it was gerd, no chest pains since 2015   • Environmental allergies    • Exophthalmos    • Goiter 2016    radiation treated   • Heart murmur    • Heartburn     no treatment   • Hypertension    • Hyperthyroidism    • Migraine    • PONV (postoperative nausea and vomiting)    • Wears glasses        Past Surgical History:        Procedure Laterality Date   • FOOT FRACTURE SURGERY  2013    rt foot(hardware remains)   • OTHER SURGICAL HISTORY Right 16    Rt foot revision and fusion   • OTHER SURGICAL HISTORY Right 2016    right foot hardware   • OVARIAN CYST SURGERY Right        Social History:    Social History     Tobacco Use   • Smoking status: Former     Current packs/day: 0.00     Average packs/day: 2.0 packs/day for 10.0 years (20.0 ttl pk-yrs)     Types: Cigarettes     Start date: 10/8/2002     Quit date: 10/8/2012     Years since quittin.2   • Smokeless tobacco: Never   Substance Use Topics   • Alcohol use: No     Alcohol/week: 0.0 standard drinks of alcohol     Comment: social                                Counseling given: Not Answered      Vital Signs (Current):   Vitals:    24 0433 24 0755 24 0801 24 1113   BP: (!) 146/88 (!) 119/91 (!) 145/94 (!) 138/92   Pulse: 72 72 71 76   Resp: 18  18   Temp: 98.2 °F (36.8 °C) 98.1 °F (36.7 °C) 98.6 °F (37 °C) 98.4 °F (36.9 °C)   TempSrc: Oral Oral Oral Oral   SpO2: 98% 97% 100% 98%   Weight:       Height:                                                  BP Readings from Last 3 Encounters:   24 (!) 138/92   24 124/78   23 137/88       NPO Status:                                                                                 BMI:   Wt Readings from Last 3 Encounters: 
[Post Op: _________] : a [unfilled] post op visit
28-Dec-2022 13:58

## 2024-03-26 ENCOUNTER — APPOINTMENT (OUTPATIENT)
Dept: NEUROSURGERY | Facility: CLINIC | Age: 69
End: 2024-03-26
Payer: MEDICAID

## 2024-03-26 VITALS
HEIGHT: 58 IN | SYSTOLIC BLOOD PRESSURE: 150 MMHG | BODY MASS INDEX: 32.54 KG/M2 | DIASTOLIC BLOOD PRESSURE: 79 MMHG | OXYGEN SATURATION: 95 % | WEIGHT: 155 LBS | HEART RATE: 65 BPM

## 2024-03-26 DIAGNOSIS — Z98.1 ARTHRODESIS STATUS: ICD-10-CM

## 2024-03-26 DIAGNOSIS — R20.0 ANESTHESIA OF SKIN: ICD-10-CM

## 2024-03-26 PROCEDURE — 99213 OFFICE O/P EST LOW 20 MIN: CPT

## 2024-03-26 NOTE — CONSULT LETTER
[Dear  ___] : Dear  [unfilled], [Courtesy Letter:] : I had the pleasure of seeing your patient, [unfilled], in my office today. [Sincerely,] : Sincerely, [FreeTextEntry2] : Nehal Junior MD [address], [place] [address] [FreeTextEntry1] : Mrs. Phillip Schumacher is a very pleasant 69-year-old female patient was seen in the office in follow-up.  The patient went transforaminal lumbar interbody fusion approximately 6 months ago.  I am happy to report that the patient continues to do well following her surgical intervention.  At this time, the patient only complains of intermittent numbness in the sole of her right foot and a feeling of heaviness at rest.  During the day, the patient does not experience any of the symptoms.  On examination, the patient remains alert, oriented, and compliant with the exam.  The patient ambulates well without the use of an assistive device.  The patient's incisions are clean, dry, and intact.  I  I have no new imaging to review today.  Taken together, I am gratified to the patient doing well following her surgical intervention.  At this time, the patient has been recommended follow-up as needed given her good clinical trajectory.  The patient was offered further evaluation of her intermittent numbness in the right foot in the form of a podiatry or neurology consult but the patient has deferred these options at this time. [FreeTextEntry3] : Shane Johnson MD, PhD, CS, FAANS Attending Neurosurgeon  of Neurosurgery [facility] at [facility] at [address], [place] [address] Office: [phone] Fax: [phone]

## 2024-04-04 NOTE — ED STATDOCS - CARDIAC, MLM
Airway  Date/Time: 4/4/2024 9:48 AM    General Information and Staff    Anesthesiologist: Mitesh Wooten MD  CRNA/CAA: Judith Morejon CRNA    Indications and Patient Condition  Indications for airway management: airway protection    Preoxygenated: yes  MILS maintained throughout  Mask difficulty assessment: 1 - vent by mask    Final Airway Details  Final airway type: endotracheal airway      Successful airway: ETT  Cuffed: yes   Successful intubation technique: direct laryngoscopy  Facilitating devices/methods: intubating stylet  Endotracheal tube insertion site: oral  Blade: Edward  Blade size: 3  ETT size (mm): 7.0  Cormack-Lehane Classification: grade I - full view of glottis  Placement verified by: chest auscultation and capnometry   Measured from: lips  ETT/EBT  to lips (cm): 21  Number of attempts at approach: 1  Assessment: lips, teeth, and gum same as pre-op and atraumatic intubation             normal rate, regular rhythm, and no murmur.

## 2024-04-07 ENCOUNTER — NON-APPOINTMENT (OUTPATIENT)
Age: 69
End: 2024-04-07

## 2024-04-29 ENCOUNTER — EMERGENCY (EMERGENCY)
Facility: HOSPITAL | Age: 69
LOS: 0 days | Discharge: HOME | End: 2024-04-30
Attending: HOSPITALIST
Payer: MEDICAID

## 2024-04-29 VITALS
WEIGHT: 149.91 LBS | HEIGHT: 64 IN | TEMPERATURE: 99 F | HEART RATE: 88 BPM | DIASTOLIC BLOOD PRESSURE: 80 MMHG | OXYGEN SATURATION: 98 % | RESPIRATION RATE: 18 BRPM | SYSTOLIC BLOOD PRESSURE: 150 MMHG

## 2024-04-29 DIAGNOSIS — M25.562 PAIN IN LEFT KNEE: ICD-10-CM

## 2024-04-29 DIAGNOSIS — Z98.890 OTHER SPECIFIED POSTPROCEDURAL STATES: Chronic | ICD-10-CM

## 2024-04-29 DIAGNOSIS — Z98.42 CATARACT EXTRACTION STATUS, LEFT EYE: Chronic | ICD-10-CM

## 2024-04-29 DIAGNOSIS — E78.5 HYPERLIPIDEMIA, UNSPECIFIED: ICD-10-CM

## 2024-04-29 DIAGNOSIS — I10 ESSENTIAL (PRIMARY) HYPERTENSION: ICD-10-CM

## 2024-04-29 DIAGNOSIS — Z98.891 HISTORY OF UTERINE SCAR FROM PREVIOUS SURGERY: Chronic | ICD-10-CM

## 2024-04-29 PROCEDURE — 99284 EMERGENCY DEPT VISIT MOD MDM: CPT

## 2024-04-29 PROCEDURE — 73562 X-RAY EXAM OF KNEE 3: CPT | Mod: LT

## 2024-04-29 PROCEDURE — 99283 EMERGENCY DEPT VISIT LOW MDM: CPT | Mod: 25

## 2024-04-29 RX ORDER — LIDOCAINE 4 G/100G
1 CREAM TOPICAL ONCE
Refills: 0 | Status: COMPLETED | OUTPATIENT
Start: 2024-04-29 | End: 2024-04-29

## 2024-04-29 RX ORDER — IBUPROFEN 200 MG
600 TABLET ORAL ONCE
Refills: 0 | Status: COMPLETED | OUTPATIENT
Start: 2024-04-29 | End: 2024-04-29

## 2024-04-29 NOTE — ED ADULT TRIAGE NOTE - CHIEF COMPLAINT QUOTE
pt bibems from home s/p praying on knees and now c/o left knee swelling and pain. VS stable. no s/s of distress

## 2024-04-30 VITALS
DIASTOLIC BLOOD PRESSURE: 98 MMHG | RESPIRATION RATE: 16 BRPM | OXYGEN SATURATION: 97 % | TEMPERATURE: 98 F | HEART RATE: 80 BPM | SYSTOLIC BLOOD PRESSURE: 150 MMHG

## 2024-04-30 PROCEDURE — 73562 X-RAY EXAM OF KNEE 3: CPT | Mod: 26,LT

## 2024-04-30 RX ORDER — OXYCODONE HYDROCHLORIDE 5 MG/1
1 TABLET ORAL
Qty: 12 | Refills: 0
Start: 2024-04-30 | End: 2024-05-02

## 2024-04-30 RX ORDER — TRAMADOL HYDROCHLORIDE 50 MG/1
25 TABLET ORAL ONCE
Refills: 0 | Status: DISCONTINUED | OUTPATIENT
Start: 2024-04-30 | End: 2024-04-30

## 2024-04-30 RX ORDER — LIDOCAINE 4 G/100G
1 CREAM TOPICAL
Qty: 5 | Refills: 0
Start: 2024-04-30 | End: 2024-05-04

## 2024-04-30 RX ADMIN — Medication 600 MILLIGRAM(S): at 00:01

## 2024-04-30 RX ADMIN — TRAMADOL HYDROCHLORIDE 25 MILLIGRAM(S): 50 TABLET ORAL at 01:23

## 2024-04-30 RX ADMIN — LIDOCAINE 1 PATCH: 4 CREAM TOPICAL at 00:01

## 2024-04-30 NOTE — ED PROVIDER NOTE - NSFOLLOWUPINSTRUCTIONS_ED_ALL_ED_FT
Take tylenol as needed for pain, 650Mg every 6-8 hours.  You can also take ibuprofen as needed for pain, 600mg every 12 hours, take with food.  Please take oxycodone only as needed for severe pain.  Caution this medication can make you drowsy.  You could also use the Lidocaine pain patches as needed for pain.  Please reapply a new patch each day and only wear for 12 hours.  Please use the Ace wrap as needed for support.  Please follow-up with orthopedic surgery for follow-up care.

## 2024-04-30 NOTE — ED PROVIDER NOTE - CARE PROVIDER_API CALL
Campos Alarcon Hinckley  Orthopaedic Surgery  [address] [place] [address], [facility] [place] [address]  Phone: [phone]  Follow Up Time: 1-3 Days

## 2024-04-30 NOTE — ED PROVIDER NOTE - CLINICAL SUMMARY MEDICAL DECISION MAKING FREE TEXT BOX
69-year-old female with acute acute on chronic knee pain.  Likely secondary to arthritis.  Will obtain imaging and treat pain with pain medication.  Patient tried to ambulate after taking pain meds without the ability to ambulate.  Percocet added which did help improve her pain and able to ambulate with a walker here in the ED.  Patient's son who she lives with states she has a walker at home.  Will discharge home with pain medication and orthopedic follow-up.

## 2024-04-30 NOTE — ED ADULT NURSE NOTE - OBJECTIVE STATEMENT
Patient presents to ED c/o L sided knee swelling and pain 10/10. Patient denies injury, fall. Patient endorses pain x3 days, worsened today, difficulty ambulating d/t pain. Patient denies fever/chills, no redness/bruising noting. Patient also endorses paresthesia to R leg s/p back sx last month. Sensation intact, RLE warm, dry, pink, AROM to BLE. Patient AxOx4, Kosovan speaking; son at bedside. Son endorses patient ambulates independently without difficulty prior to pain starting.

## 2024-04-30 NOTE — ED ADULT NURSE NOTE - NSFALLRISKINTERV_ED_ALL_ED
Assistance OOB with selected safe patient handling equipment if applicable/Assistance with ambulation/Communicate fall risk and risk factors to all staff, patient, and family/Monitor gait and stability/Provide visual cue: yellow wristband, yellow gown, etc/Reinforce activity limits and safety measures with patient and family/Call bell, personal items and telephone in reach/Instruct patient to call for assistance before getting out of bed/chair/stretcher/Non-slip footwear applied when patient is off stretcher/[employer] to call system/Physically safe environment - no spills, clutter or unnecessary equipment/Purposeful Proactive Rounding/Room/bathroom lighting operational, light cord in reach

## 2024-04-30 NOTE — ED PROVIDER NOTE - PHYSICAL EXAMINATION
Constitutional: NAD AAOx3  Eyes: PERRLA EOMI  Head: Normocephalic atraumatic  Mouth: MMM  Cardiac: regular rate   Resp: Lungs CTAB  GI: Abd s/nt/nd  Ext: + ttp over medial aspect of left knee and pain with extension of left knee.   Neuro: CN2-12 intact  Skin: No visible rashes

## 2024-04-30 NOTE — ED PROVIDER NOTE - PATIENT PORTAL LINK FT
You can access the FollowMyHealth Patient Portal offered by [facility] by registering at the following website: [email or web address]. By joining [employer]’s FollowMyHealth portal, you will also be able to view your health information using other applications (apps) compatible with our system.

## 2024-04-30 NOTE — ED PROVIDER NOTE - OBJECTIVE STATEMENT
69-year-old female with past medical history of hypertension, hyperlipidemia, chronic knee pain presents with increased left knee pain.  Patient was at [employer] today and sitting And then she went to get up and was unable to stand secondary to left knee pain.  No trauma or injuries.  Despite triage note patient states she was not kneeling today on her knees.  Did try taking Tylenol prior to arrival without any improvement.  No fever.

## 2024-05-13 ENCOUNTER — APPOINTMENT (OUTPATIENT)
Dept: ORTHOPEDIC SURGERY | Facility: CLINIC | Age: 69
End: 2024-05-13

## 2024-07-11 NOTE — ED STATDOCS - CLINICAL SUMMARY MEDICAL DECISION MAKING FREE TEXT BOX
Cooling (In C): 15 Hide Repetition Rate?: No Fluence (J/Cm2): 8 Fluence (J/Cm2): 21 Spot Size: Finesse Adapter Size: 15 x 15 mm square Spot Size: Finesse Adapter Size: 11 mm round Repetition Rate (Hz): 0 Repetition Rate (Hz): 10 Treated Area: small area Prepaid Number Of Treatments: 1 Cooling ?: Yes Pulse Duration Units: milliseconds Pulse Duration: 20 Fluence (J/Cm2): 25 Passes: 2 Fluence (J/Cm2): 13 Spot Size: Finesse Adapter Size: 15 x 45 mm (No Finesse Adapter) Detail Level: Zone Repetition Rate (Hz): 12 Indication Override (Will Not Show Above If Text Entered): base Preprocedure Text: The treatment areas were thoroughly cleaned. Clear ultrasound gel was applied to the treatment area. The area was treated with no immediate stacking of pulses. Post-Care Instructions: I reviewed with the patient in detail post-care instructions. Patient should stay away from the sun and wear sun protection until treated areas are fully healed. Consent: Written consent obtained, risks reviewed including but not limited to crusting, scabbing, blistering, scarring, darker or lighter pigmentary change, bruising, and/or incomplete response. Price (Use Numbers Only, No Special Characters Or $): 438 Post Procedure Text: The patient tolerated the procedure well. Post care was reviewed with the patient. 66 y/o w/ slightly viral illness. Will get CXR, flu/COVID swab, and reeval. 68 y/o w/ slightly viral illness. Will get CXR, flu/COVID swab, and reeval.            Pt. with probable viral syndrome.  CXR ? LLL infiltrate will treat with Zithromax.  DC with PMD follow up and return precautions.  Judith Sen PA-C

## 2024-12-27 ENCOUNTER — EMERGENCY (EMERGENCY)
Facility: HOSPITAL | Age: 69
LOS: 0 days | Discharge: HOME | End: 2024-12-27
Attending: EMERGENCY MEDICINE
Payer: MEDICAID

## 2024-12-27 VITALS
SYSTOLIC BLOOD PRESSURE: 126 MMHG | RESPIRATION RATE: 17 BRPM | DIASTOLIC BLOOD PRESSURE: 65 MMHG | HEART RATE: 86 BPM | TEMPERATURE: 99 F | OXYGEN SATURATION: 96 %

## 2024-12-27 VITALS
SYSTOLIC BLOOD PRESSURE: 156 MMHG | TEMPERATURE: 102 F | OXYGEN SATURATION: 98 % | RESPIRATION RATE: 18 BRPM | DIASTOLIC BLOOD PRESSURE: 74 MMHG | HEART RATE: 98 BPM

## 2024-12-27 DIAGNOSIS — Z98.891 HISTORY OF UTERINE SCAR FROM PREVIOUS SURGERY: Chronic | ICD-10-CM

## 2024-12-27 DIAGNOSIS — Z98.42 CATARACT EXTRACTION STATUS, LEFT EYE: Chronic | ICD-10-CM

## 2024-12-27 DIAGNOSIS — Z98.890 OTHER SPECIFIED POSTPROCEDURAL STATES: Chronic | ICD-10-CM

## 2024-12-27 LAB
ACETONE SERPL-MCNC: NEGATIVE — SIGNIFICANT CHANGE UP
ALBUMIN SERPL ELPH-MCNC: 4 G/DL — SIGNIFICANT CHANGE UP (ref 3.3–5)
ALP SERPL-CCNC: 71 U/L — SIGNIFICANT CHANGE UP (ref 40–120)
ALT FLD-CCNC: 21 U/L — SIGNIFICANT CHANGE UP (ref 12–78)
ANION GAP SERPL CALC-SCNC: 6 MMOL/L — SIGNIFICANT CHANGE UP (ref 5–17)
APPEARANCE UR: CLEAR — SIGNIFICANT CHANGE UP
APTT BLD: 27.2 SEC — SIGNIFICANT CHANGE UP (ref 24.5–35.6)
AST SERPL-CCNC: 24 U/L — SIGNIFICANT CHANGE UP (ref 15–37)
BACTERIA # UR AUTO: NEGATIVE /HPF — SIGNIFICANT CHANGE UP
BASE EXCESS BLDV CALC-SCNC: 3 MMOL/L — SIGNIFICANT CHANGE UP (ref -2–3)
BASOPHILS # BLD AUTO: 0.03 K/UL — SIGNIFICANT CHANGE UP (ref 0–0.2)
BASOPHILS NFR BLD AUTO: 0.5 % — SIGNIFICANT CHANGE UP (ref 0–2)
BILIRUB SERPL-MCNC: 0.8 MG/DL — SIGNIFICANT CHANGE UP (ref 0.2–1.2)
BILIRUB UR-MCNC: NEGATIVE — SIGNIFICANT CHANGE UP
BUN SERPL-MCNC: 10 MG/DL — SIGNIFICANT CHANGE UP (ref 7–23)
CALCIUM SERPL-MCNC: 9.5 MG/DL — SIGNIFICANT CHANGE UP (ref 8.5–10.1)
CAST: 1 /LPF — SIGNIFICANT CHANGE UP (ref 0–4)
CHLORIDE SERPL-SCNC: 98 MMOL/L — SIGNIFICANT CHANGE UP (ref 96–108)
CO2 SERPL-SCNC: 28 MMOL/L — SIGNIFICANT CHANGE UP (ref 22–31)
COLOR SPEC: SIGNIFICANT CHANGE UP
CREAT SERPL-MCNC: 0.84 MG/DL — SIGNIFICANT CHANGE UP (ref 0.5–1.3)
DIFF PNL FLD: NEGATIVE — SIGNIFICANT CHANGE UP
EGFR: 75 ML/MIN/1.73M2 — SIGNIFICANT CHANGE UP
EOSINOPHIL # BLD AUTO: 0.04 K/UL — SIGNIFICANT CHANGE UP (ref 0–0.5)
EOSINOPHIL NFR BLD AUTO: 0.7 % — SIGNIFICANT CHANGE UP (ref 0–6)
FLUAV AG NPH QL: DETECTED
FLUBV AG NPH QL: SIGNIFICANT CHANGE UP
GLUCOSE SERPL-MCNC: 137 MG/DL — HIGH (ref 70–99)
GLUCOSE UR QL: NEGATIVE MG/DL — SIGNIFICANT CHANGE UP
HCO3 BLDV-SCNC: 28 MMOL/L — SIGNIFICANT CHANGE UP (ref 22–29)
HCT VFR BLD CALC: 38.1 % — SIGNIFICANT CHANGE UP (ref 34.5–45)
HGB BLD-MCNC: 12.8 G/DL — SIGNIFICANT CHANGE UP (ref 11.5–15.5)
IMM GRANULOCYTES NFR BLD AUTO: 0.5 % — SIGNIFICANT CHANGE UP (ref 0–0.9)
INR BLD: 1.04 RATIO — SIGNIFICANT CHANGE UP (ref 0.85–1.16)
KETONES UR-MCNC: 40 MG/DL
LACTATE SERPL-SCNC: 1.1 MMOL/L — SIGNIFICANT CHANGE UP (ref 0.7–2)
LEUKOCYTE ESTERASE UR-ACNC: ABNORMAL
LYMPHOCYTES # BLD AUTO: 0.67 K/UL — LOW (ref 1–3.3)
LYMPHOCYTES # BLD AUTO: 12.1 % — LOW (ref 13–44)
MCHC RBC-ENTMCNC: 29.1 PG — SIGNIFICANT CHANGE UP (ref 27–34)
MCHC RBC-ENTMCNC: 33.6 G/DL — SIGNIFICANT CHANGE UP (ref 32–36)
MCV RBC AUTO: 86.6 FL — SIGNIFICANT CHANGE UP (ref 80–100)
MONOCYTES # BLD AUTO: 0.55 K/UL — SIGNIFICANT CHANGE UP (ref 0–0.9)
MONOCYTES NFR BLD AUTO: 9.9 % — SIGNIFICANT CHANGE UP (ref 2–14)
NEUTROPHILS # BLD AUTO: 4.24 K/UL — SIGNIFICANT CHANGE UP (ref 1.8–7.4)
NEUTROPHILS NFR BLD AUTO: 76.3 % — SIGNIFICANT CHANGE UP (ref 43–77)
NITRITE UR-MCNC: NEGATIVE — SIGNIFICANT CHANGE UP
PCO2 BLDV: 46 MMHG — HIGH (ref 39–42)
PH BLDV: 7.4 — SIGNIFICANT CHANGE UP (ref 7.32–7.43)
PH UR: 7.5 — SIGNIFICANT CHANGE UP (ref 5–8)
PLATELET # BLD AUTO: 259 K/UL — SIGNIFICANT CHANGE UP (ref 150–400)
PO2 BLDV: 43 MMHG — SIGNIFICANT CHANGE UP (ref 25–45)
POTASSIUM SERPL-MCNC: 3.6 MMOL/L — SIGNIFICANT CHANGE UP (ref 3.5–5.3)
POTASSIUM SERPL-SCNC: 3.6 MMOL/L — SIGNIFICANT CHANGE UP (ref 3.5–5.3)
PROT SERPL-MCNC: 8 GM/DL — SIGNIFICANT CHANGE UP (ref 6–8.3)
PROT UR-MCNC: 100 MG/DL
PROTHROM AB SERPL-ACNC: 12 SEC — SIGNIFICANT CHANGE UP (ref 9.9–13.4)
RBC # BLD: 4.4 M/UL — SIGNIFICANT CHANGE UP (ref 3.8–5.2)
RBC # FLD: 14.3 % — SIGNIFICANT CHANGE UP (ref 10.3–14.5)
RBC CASTS # UR COMP ASSIST: 9 /HPF — HIGH (ref 0–4)
RSV RNA NPH QL NAA+NON-PROBE: SIGNIFICANT CHANGE UP
SAO2 % BLDV: 73 % — SIGNIFICANT CHANGE UP (ref 67–88)
SARS-COV-2 RNA SPEC QL NAA+PROBE: SIGNIFICANT CHANGE UP
SODIUM SERPL-SCNC: 132 MMOL/L — LOW (ref 135–145)
SP GR SPEC: 1.02 — SIGNIFICANT CHANGE UP (ref 1–1.03)
SQUAMOUS # UR AUTO: 3 /HPF — SIGNIFICANT CHANGE UP (ref 0–5)
TROPONIN I, HIGH SENSITIVITY RESULT: 39.06 NG/L — SIGNIFICANT CHANGE UP
UROBILINOGEN FLD QL: 1 MG/DL — SIGNIFICANT CHANGE UP (ref 0.2–1)
WBC # BLD: 5.56 K/UL — SIGNIFICANT CHANGE UP (ref 3.8–10.5)
WBC # FLD AUTO: 5.56 K/UL — SIGNIFICANT CHANGE UP (ref 3.8–10.5)
WBC UR QL: 2 /HPF — SIGNIFICANT CHANGE UP (ref 0–5)

## 2024-12-27 PROCEDURE — 96374 THER/PROPH/DIAG INJ IV PUSH: CPT

## 2024-12-27 PROCEDURE — 84484 ASSAY OF TROPONIN QUANT: CPT

## 2024-12-27 PROCEDURE — 85610 PROTHROMBIN TIME: CPT

## 2024-12-27 PROCEDURE — 85025 COMPLETE CBC W/AUTO DIFF WBC: CPT

## 2024-12-27 PROCEDURE — 81001 URINALYSIS AUTO W/SCOPE: CPT

## 2024-12-27 PROCEDURE — 93005 ELECTROCARDIOGRAM TRACING: CPT

## 2024-12-27 PROCEDURE — 87040 BLOOD CULTURE FOR BACTERIA: CPT

## 2024-12-27 PROCEDURE — 83605 ASSAY OF LACTIC ACID: CPT

## 2024-12-27 PROCEDURE — 96375 TX/PRO/DX INJ NEW DRUG ADDON: CPT

## 2024-12-27 PROCEDURE — 99285 EMERGENCY DEPT VISIT HI MDM: CPT

## 2024-12-27 PROCEDURE — 0241U: CPT

## 2024-12-27 PROCEDURE — 85730 THROMBOPLASTIN TIME PARTIAL: CPT

## 2024-12-27 PROCEDURE — 99285 EMERGENCY DEPT VISIT HI MDM: CPT | Mod: 25

## 2024-12-27 PROCEDURE — 82803 BLOOD GASES ANY COMBINATION: CPT

## 2024-12-27 PROCEDURE — 36415 COLL VENOUS BLD VENIPUNCTURE: CPT

## 2024-12-27 PROCEDURE — 71045 X-RAY EXAM CHEST 1 VIEW: CPT | Mod: 26

## 2024-12-27 PROCEDURE — 80053 COMPREHEN METABOLIC PANEL: CPT

## 2024-12-27 PROCEDURE — 93010 ELECTROCARDIOGRAM REPORT: CPT

## 2024-12-27 PROCEDURE — 71045 X-RAY EXAM CHEST 1 VIEW: CPT

## 2024-12-27 PROCEDURE — 82009 KETONE BODYS QUAL: CPT

## 2024-12-27 RX ORDER — KETOROLAC TROMETHAMINE 30 MG/ML
15 INJECTION INTRAMUSCULAR; INTRAVENOUS ONCE
Refills: 0 | Status: DISCONTINUED | OUTPATIENT
Start: 2024-12-27 | End: 2024-12-27

## 2024-12-27 RX ORDER — SODIUM CHLORIDE 9 MG/ML
1950 INJECTION, SOLUTION INTRAMUSCULAR; INTRAVENOUS; SUBCUTANEOUS ONCE
Refills: 0 | Status: COMPLETED | OUTPATIENT
Start: 2024-12-27 | End: 2024-12-27

## 2024-12-27 RX ORDER — ACETAMINOPHEN 500MG 500 MG/1
1000 TABLET, COATED ORAL ONCE
Refills: 0 | Status: COMPLETED | OUTPATIENT
Start: 2024-12-27 | End: 2024-12-27

## 2024-12-27 RX ADMIN — ACETAMINOPHEN 500MG 400 MILLIGRAM(S): 500 TABLET, COATED ORAL at 11:13

## 2024-12-27 RX ADMIN — KETOROLAC TROMETHAMINE 15 MILLIGRAM(S): 30 INJECTION INTRAMUSCULAR; INTRAVENOUS at 12:22

## 2024-12-27 RX ADMIN — SODIUM CHLORIDE 1950 MILLILITER(S): 9 INJECTION, SOLUTION INTRAMUSCULAR; INTRAVENOUS; SUBCUTANEOUS at 11:09

## 2024-12-27 NOTE — ED STATDOCS - PATIENT PORTAL LINK FT
You can access the FollowMyHealth Patient Portal offered by [facility] by registering at the following website: [email or web address]. By joining [employer]’s FollowMyHealth portal, you will also be able to view your health information using other applications (apps) compatible with our system.

## 2024-12-27 NOTE — ED STATDOCS - PROGRESS NOTE DETAILS
#[id number]  69-year-old female history of hypertension high cholesterol diabetes presents to the emergency department with fever cough congestion body aches.  Symptoms present for the past 4 days.  No one else at home sick.  Cough productive of sputum.  No chest pain.  Patient does have dysuria.    Exam: Non-focal    Plan: Swab (Flu/Covid), labs, urine, fluids, CXR  Salina Holloway DNP Pt is well appearing walking with steady gait, stable for discharge and follow up without fail with medical doctor. I had a detailed discussion with the patient and/or guardian regarding the historical points, exam findings, and any diagnostic results supporting the discharge diagnosis. Pt educated on care and need for follow up. Strict return instructions and red flag signs and symptoms discussed with patient. Questions answered. Pt shows understanding of discharge information and agrees to follow.

## 2024-12-27 NOTE — ED STATDOCS - NSFOLLOWUPINSTRUCTIONS_ED_ALL_ED_FT
Gripe H1N1    LO QUE NECESITA SABER:    La gripe o influenza H1N1 (gripe porcina) es nakia infección provocada por un virus. Se contagia fácilmente cuando nakia persona infectada tose, estornuda o tiene contacto cercano con otras personas. El virus H1N1 se podría propagar a otras personas becca 1 semana o más después de que aparezcan los primeros signos o síntomas.    INSTRUCCIONES SOBRE EL HELDER HOSPITALARIA:    Llame al número de emergencias local (911 en los [place]) en cualquiera de los siguientes casos:    Tiene dificultad para respirar, y glenda labios lucen púrpuras o azules.    Usted sufre nakia convulsión.    Usted comienza a sentir dolor o presión en el pecho.  Busque atención médica de inmediato si:    Usted se siente mareado, orina poco o no orina.    Usted tiene dolor de cliff con rigidez en el feliz y se siente cansado o confundido.    Glenda síntomas empeoran o empiezan a mejorar sherwin luego vuelven a empeorar.  Llame a murry médico si:    Usted tiene dolor muscular o debilidad nuevos.    Usted tiene preguntas o inquietudes acerca de murry condición o cuidado.  Medicamentos:Es posible que usted necesite alguno de los siguientes:    Acetaminofénalivia el dolor y baja la fiebre. Está disponible sin receta médica. Pregunte la cantidad y la frecuencia con que debe tomarlos. Siga las indicaciones. Kylah las etiquetas de todos los demás medicamentos que esté usando para saber si también contienen acetaminofén, o pregunte a murry médico o farmacéutico. El acetaminofén puede causar daño en el hígado cuando no se miranda de forma correcta.    AINEcomo el ibuprofeno, ayudan a disminuir la inflamación, el dolor y la fiebre. Maura medicamento está disponible con o sin nakia receta médica. Los TANNA pueden causar sangrado estomacal o problemas renales en ciertas personas. Si usted miranda un medicamento anticoagulante, siempre pregúntele a murry médico si los TANNA son seguros para usted. Siempre kylah la etiqueta de maura medicamento y siga las instrucciones.    Los antiviralesayudan a combatir nakia infección viral. Maura medicamento funciona mejor cuando se aplica dentro de 48 horas después que los síntomas hayan empezado.    [place] glenda medicamentos dawna se le haya indicado.Consulte con murry médico si usted abe que murry medicamento no le está ayudando o si presenta efectos secundarios. Infórmele al médico si usted es alérgico a algún medicamento. Mantenga nakia lista actualizada de los medicamentos, las vitaminas y los productos herbales que miranda. Incluya los siguientes datos de los medicamentos: cantidad, frecuencia y motivo de administración. Traiga con usted la lista o los envases de las píldoras a glenda citas de seguimiento. Lleve la lista de los medicamentos con usted en lyndsey de nakia emergencia.  El manejo de glenda síntomas:    Descanse el mayor tiempo posible.Empiece a hacer un poco más día a día.    [place] más líquidos dawna se le indique.Los líquidos le ayudarán a aflojar y disolver la mucosidad para que la pueda expectorar. Los líquidos ayudarán a evitar la deshidratación. Los líquidos que ayudan a prevenir la deshidratación pueden ser agua, jugo de fruta y caldo. No tome líquidos que contienen cafeína. La cafeína puede aumentar el riesgo de deshidratación. Pregúntele a murry médico cuál es la cantidad de líquido que necesita ingerir a diario.    Alivie el dolor de garganta.Sarina gárgaras de agua tibia con sal. [place]-hey ayuda a aliviar el dolor de garganta. Prepare agua salina disolviendo ¼ de cucharada de sal a 1 taza de agua tibia. Usted puede también chupar dulces duros o pastillas para la garganta. Usted puede usar aerosol para el dolor de garganta.    Use un humidificador o vaporizador.Use un humidificador de johana frío o un vaporizador para elevar la humedad en murry casa. [place]ah-ta-hey podría ayudarle a respirar más fácilmente y al mismo tiempo disminuir la tos.    Use gotas nasales de solución salina dawna se le haya indicado.Estas ayudan a aliviar la congestión.    Aplique vaselina en la parte externa alrededor de las fosas nasales.Esta puede disminuir la irritación de sonarse la nariz.    No fume.La nicotina y otros químicos en los cigarrillos y cigarros pueden empeorar glenda síntomas. También pueden causar infecciones dawna la bronquitis o la neumonía. Pida información a murry médico si usted actualmente fuma y necesita ayuda para dejar de fumar. Los cigarrillos electrónicos o el tabaco sin humo igualmente contienen nicotina. Consulte con murry médico antes de utilizar estos productos.  Evite la propagación de la gripe H1N1:    Lávese las swati frecuentemente.Use agua y jabón cada vez que se lave las swati. Frótese las swati enjabonadas, entrelazando los dedos. Use los dedos de nakia mano para restregar debajo de las uñas de la otra mano. Lávese becca al menos 20 segundos. Enjuague con agua corriente caliente becca varios segundos. Luego séquese las swati con nakia toalla limpia o nakia toalla de papel. Use un desinfectante para swati si no hay agua y jabón disponibles. No se toque los ojos, la nariz o la boca sin antes lavarse las swati.  Lavado de swati      Cúbrase al toser o estornudar.Use un pañuelo que cubra la boca y la nariz. Arroje el pañuelo a la basura de inmediato. Use el ángulo del brazo si no tiene un pañuelo disponible. Lávese las swati con agua y jabón o use un desinfectante de swati. No se pare cerca de nadie que esté estornudando o tosiendo.    Limpie los artículos que se comparten con nakia solución de limpieza ramirez gérmenes.Limpie las superficies de las mesas, las manijas de las renee y los interruptores rabia. No comparta las toallas, los cubiertos ni platos con nadie. Lave las sábanas, toallas, cubiertos y vajilla con [place] y jabón.    Manténgase alejado de los demás si está enfermo.No vaya al trabajo, a la escuela o a otras actividades hasta que la fiebre, la tos u otros síntomas hayan desaparecido.    Vacúnese contra la influenza para ayudar a prevenir la gripe.Debe vacunarse tan pronto dawna se lo recomienden cada año, generalmente en septiembre u octubre. No necesita nakia vacuna separada para la H1N1. Las vacunas contra la gripe incluyen la protección contra la H1N1.  Acuda a la consulta de control con murry médico según las indicaciones:Anote glenda preguntas para que se acuerde de hacerlas becca glenda visitas.

## 2024-12-27 NOTE — ED ADULT TRIAGE NOTE - LOCATION:

## 2024-12-27 NOTE — ED STATDOCS - CLINICAL SUMMARY MEDICAL DECISION MAKING FREE TEXT BOX
69-year-old female history of hypertension high cholesterol diabetes presents to the emergency department with fever cough congestion body aches.  Symptoms present for the past 4 days.  No one else at home sick.  Cough productive of sputum.  No chest pain.  Patient does have dysuria.  Exam with clear lungs bilaterally no lower extremity swelling.  Patient with flulike symptoms febrile to 102 here will workup to rule out pneumonia UTI check labs viral panel symptom control reassess.

## 2024-12-27 NOTE — ED STATDOCS - PHYSICAL EXAMINATION
Constitutional: febrile, AAOx3  Eyes: PERRLA EOMI  Head: Normocephalic atraumatic  Mouth: MMM  Cardiac: regular rate   Resp: unlabored breathing,  clear lungs bilaterally   GI: Abd s/nt/nd  Neuro: grossly normal and intact  Skin: No visible rashes  MSK: no lower extremity swelling

## 2024-12-27 NOTE — ED STATDOCS - ATTENDING APP SHARED VISIT CONTRIBUTION OF CARE
I, Ishmael Cade MD, personally saw the patient with ACP.  I have personally performed a face to face diagnostic evaluation on this patient.   The initial assessment was performed by myself and then the patient was handed off to the ACP. The patient was followed and re-evaluated by the ACP. All labs, imaging and procedures were evaluated and performed by the ACP and I was available for consultation if any questions in the patients care came up.   I personally made/approved the management plan and take responsibility for the patient management.

## 2024-12-27 NOTE — ED STATDOCS - OBJECTIVE STATEMENT
#[id number]  69-year-old female history of hypertension high cholesterol diabetes presents to the emergency department with fever cough congestion body aches.  Symptoms present for the past 4 days.  No one else at home sick.  Cough productive of sputum.  No chest pain.  Patient does have dysuria.
